# Patient Record
Sex: MALE | Race: WHITE | Employment: FULL TIME | URBAN - METROPOLITAN AREA
[De-identification: names, ages, dates, MRNs, and addresses within clinical notes are randomized per-mention and may not be internally consistent; named-entity substitution may affect disease eponyms.]

---

## 2021-04-11 ENCOUNTER — APPOINTMENT (OUTPATIENT)
Dept: CT IMAGING | Age: 36
DRG: 872 | End: 2021-04-11
Payer: COMMERCIAL

## 2021-04-11 ENCOUNTER — HOSPITAL ENCOUNTER (INPATIENT)
Age: 36
LOS: 3 days | Discharge: HOME | DRG: 872 | End: 2021-04-14
Attending: EMERGENCY MEDICINE | Admitting: INTERNAL MEDICINE
Payer: COMMERCIAL

## 2021-04-11 DIAGNOSIS — A41.9 SEPTICEMIA (HCC): Primary | ICD-10-CM

## 2021-04-11 DIAGNOSIS — K57.20 DIVERTICULITIS OF LARGE INTESTINE WITH PERFORATION WITHOUT BLEEDING: ICD-10-CM

## 2021-04-11 PROBLEM — E66.01 OBESITY, CLASS III, BMI 40-49.9 (MORBID OBESITY) (HCC): Status: ACTIVE | Noted: 2021-04-11

## 2021-04-11 PROBLEM — K57.30 DIVERTICULAR DISEASE OF LARGE INTESTINE WITH COMPLICATION: Status: ACTIVE | Noted: 2021-04-11

## 2021-04-11 PROBLEM — E87.1 HYPONATREMIA: Status: ACTIVE | Noted: 2021-04-11

## 2021-04-11 LAB
-: ABNORMAL
ABSOLUTE EOS #: 0.1 K/UL (ref 0–0.4)
ABSOLUTE IMMATURE GRANULOCYTE: ABNORMAL K/UL (ref 0–0.3)
ABSOLUTE LYMPH #: 3.4 K/UL (ref 1–4.8)
ABSOLUTE MONO #: 1.2 K/UL (ref 0.1–1.2)
ALBUMIN SERPL-MCNC: 4.1 G/DL (ref 3.5–5.2)
ALBUMIN/GLOBULIN RATIO: 1.1 (ref 1–2.5)
ALP BLD-CCNC: 86 U/L (ref 40–129)
ALT SERPL-CCNC: 75 U/L (ref 5–41)
AMORPHOUS: ABNORMAL
ANION GAP SERPL CALCULATED.3IONS-SCNC: 11 MMOL/L (ref 9–17)
AST SERPL-CCNC: 30 U/L
BACTERIA: ABNORMAL
BASOPHILS # BLD: 1 % (ref 0–2)
BASOPHILS ABSOLUTE: 0.1 K/UL (ref 0–0.2)
BILIRUB SERPL-MCNC: 0.84 MG/DL (ref 0.3–1.2)
BILIRUBIN URINE: NEGATIVE
BUN BLDV-MCNC: 9 MG/DL (ref 6–20)
BUN/CREAT BLD: ABNORMAL (ref 9–20)
CALCIUM SERPL-MCNC: 9.3 MG/DL (ref 8.6–10.4)
CASTS UA: ABNORMAL /LPF
CHLORIDE BLD-SCNC: 95 MMOL/L (ref 98–107)
CO2: 25 MMOL/L (ref 20–31)
COLOR: YELLOW
COMMENT UA: ABNORMAL
CREAT SERPL-MCNC: 0.85 MG/DL (ref 0.7–1.2)
CRYSTALS, UA: ABNORMAL /HPF
DIFFERENTIAL TYPE: ABNORMAL
EOSINOPHILS RELATIVE PERCENT: 0 % (ref 1–4)
EPITHELIAL CELLS UA: ABNORMAL /HPF (ref 0–5)
GFR AFRICAN AMERICAN: >60 ML/MIN
GFR NON-AFRICAN AMERICAN: >60 ML/MIN
GFR SERPL CREATININE-BSD FRML MDRD: ABNORMAL ML/MIN/{1.73_M2}
GFR SERPL CREATININE-BSD FRML MDRD: ABNORMAL ML/MIN/{1.73_M2}
GLUCOSE BLD-MCNC: 113 MG/DL (ref 75–110)
GLUCOSE BLD-MCNC: 125 MG/DL (ref 75–110)
GLUCOSE BLD-MCNC: 157 MG/DL (ref 75–110)
GLUCOSE BLD-MCNC: 177 MG/DL (ref 70–99)
GLUCOSE URINE: NEGATIVE
HCT VFR BLD CALC: 44.3 % (ref 41–53)
HEMOGLOBIN: 15.1 G/DL (ref 13.5–17.5)
IMMATURE GRANULOCYTES: ABNORMAL %
INR BLD: 1
KETONES, URINE: NEGATIVE
LACTIC ACID, WHOLE BLOOD: NORMAL MMOL/L (ref 0.7–2.1)
LACTIC ACID: 1.7 MMOL/L (ref 0.5–2.2)
LACTIC ACID: 2.9 MMOL/L (ref 0.5–2.2)
LEUKOCYTE ESTERASE, URINE: NEGATIVE
LYMPHOCYTES # BLD: 21 % (ref 24–44)
MCH RBC QN AUTO: 29.8 PG (ref 26–34)
MCHC RBC AUTO-ENTMCNC: 34.1 G/DL (ref 31–37)
MCV RBC AUTO: 87.6 FL (ref 80–100)
MONOCYTES # BLD: 7 % (ref 2–11)
MUCUS: ABNORMAL
NITRITE, URINE: NEGATIVE
NRBC AUTOMATED: ABNORMAL PER 100 WBC
OTHER OBSERVATIONS UA: ABNORMAL
PARTIAL THROMBOPLASTIN TIME: 25.6 SEC (ref 21.3–31.3)
PDW BLD-RTO: 13 % (ref 12.5–15.4)
PH UA: 5.5 (ref 5–8)
PLATELET # BLD: 224 K/UL (ref 140–450)
PLATELET ESTIMATE: ABNORMAL
PMV BLD AUTO: 8.6 FL (ref 6–12)
POTASSIUM SERPL-SCNC: 3.7 MMOL/L (ref 3.7–5.3)
PROCALCITONIN: 0.31 NG/ML
PROTEIN UA: ABNORMAL
PROTHROMBIN TIME: 10.7 SEC (ref 9.4–12.6)
RBC # BLD: 5.06 M/UL (ref 4.5–5.9)
RBC # BLD: ABNORMAL 10*6/UL
RBC UA: ABNORMAL /HPF (ref 0–2)
RENAL EPITHELIAL, UA: ABNORMAL /HPF
SARS-COV-2, RAPID: NOT DETECTED
SEG NEUTROPHILS: 71 % (ref 36–66)
SEGMENTED NEUTROPHILS ABSOLUTE COUNT: 11.6 K/UL (ref 1.8–7.7)
SODIUM BLD-SCNC: 131 MMOL/L (ref 135–144)
SPECIFIC GRAVITY UA: 1.02 (ref 1–1.03)
SPECIMEN DESCRIPTION: NORMAL
TOTAL PROTEIN: 7.7 G/DL (ref 6.4–8.3)
TRICHOMONAS: ABNORMAL
TURBIDITY: CLEAR
URINE HGB: NEGATIVE
UROBILINOGEN, URINE: NORMAL
WBC # BLD: 16.4 K/UL (ref 3.5–11)
WBC # BLD: ABNORMAL 10*3/UL
WBC UA: ABNORMAL /HPF (ref 0–5)
YEAST: ABNORMAL

## 2021-04-11 PROCEDURE — 2500000003 HC RX 250 WO HCPCS: Performed by: EMERGENCY MEDICINE

## 2021-04-11 PROCEDURE — 80053 COMPREHEN METABOLIC PANEL: CPT

## 2021-04-11 PROCEDURE — 6360000002 HC RX W HCPCS: Performed by: NURSE PRACTITIONER

## 2021-04-11 PROCEDURE — 84145 PROCALCITONIN (PCT): CPT

## 2021-04-11 PROCEDURE — 6360000002 HC RX W HCPCS: Performed by: EMERGENCY MEDICINE

## 2021-04-11 PROCEDURE — 96374 THER/PROPH/DIAG INJ IV PUSH: CPT

## 2021-04-11 PROCEDURE — 83605 ASSAY OF LACTIC ACID: CPT

## 2021-04-11 PROCEDURE — 1210000000 HC MED SURG R&B

## 2021-04-11 PROCEDURE — 85730 THROMBOPLASTIN TIME PARTIAL: CPT

## 2021-04-11 PROCEDURE — 82947 ASSAY GLUCOSE BLOOD QUANT: CPT

## 2021-04-11 PROCEDURE — 6370000000 HC RX 637 (ALT 250 FOR IP): Performed by: NURSE PRACTITIONER

## 2021-04-11 PROCEDURE — 87635 SARS-COV-2 COVID-19 AMP PRB: CPT

## 2021-04-11 PROCEDURE — 2580000003 HC RX 258: Performed by: EMERGENCY MEDICINE

## 2021-04-11 PROCEDURE — APPSS180 APP SPLIT SHARED TIME > 60 MINUTES: Performed by: NURSE PRACTITIONER

## 2021-04-11 PROCEDURE — 2580000003 HC RX 258: Performed by: NURSE PRACTITIONER

## 2021-04-11 PROCEDURE — 99285 EMERGENCY DEPT VISIT HI MDM: CPT

## 2021-04-11 PROCEDURE — 99222 1ST HOSP IP/OBS MODERATE 55: CPT | Performed by: INTERNAL MEDICINE

## 2021-04-11 PROCEDURE — 87040 BLOOD CULTURE FOR BACTERIA: CPT

## 2021-04-11 PROCEDURE — 83036 HEMOGLOBIN GLYCOSYLATED A1C: CPT

## 2021-04-11 PROCEDURE — 85025 COMPLETE CBC W/AUTO DIFF WBC: CPT

## 2021-04-11 PROCEDURE — 36415 COLL VENOUS BLD VENIPUNCTURE: CPT

## 2021-04-11 PROCEDURE — 2500000003 HC RX 250 WO HCPCS: Performed by: NURSE PRACTITIONER

## 2021-04-11 PROCEDURE — 81001 URINALYSIS AUTO W/SCOPE: CPT

## 2021-04-11 PROCEDURE — 74176 CT ABD & PELVIS W/O CONTRAST: CPT

## 2021-04-11 PROCEDURE — 85610 PROTHROMBIN TIME: CPT

## 2021-04-11 RX ORDER — LISINOPRIL 40 MG/1
40 TABLET ORAL DAILY
COMMUNITY

## 2021-04-11 RX ORDER — NICOTINE POLACRILEX 4 MG
15 LOZENGE BUCCAL PRN
Status: DISCONTINUED | OUTPATIENT
Start: 2021-04-11 | End: 2021-04-14 | Stop reason: HOSPADM

## 2021-04-11 RX ORDER — LISINOPRIL 20 MG/1
40 TABLET ORAL DAILY
Status: DISCONTINUED | OUTPATIENT
Start: 2021-04-11 | End: 2021-04-14 | Stop reason: HOSPADM

## 2021-04-11 RX ORDER — PROMETHAZINE HYDROCHLORIDE 25 MG/1
12.5 TABLET ORAL EVERY 6 HOURS PRN
Status: DISCONTINUED | OUTPATIENT
Start: 2021-04-11 | End: 2021-04-14 | Stop reason: HOSPADM

## 2021-04-11 RX ORDER — DEXTROSE MONOHYDRATE 50 MG/ML
100 INJECTION, SOLUTION INTRAVENOUS PRN
Status: DISCONTINUED | OUTPATIENT
Start: 2021-04-11 | End: 2021-04-14 | Stop reason: HOSPADM

## 2021-04-11 RX ORDER — HYDROCODONE BITARTRATE AND ACETAMINOPHEN 5; 325 MG/1; MG/1
2 TABLET ORAL EVERY 4 HOURS PRN
Status: DISCONTINUED | OUTPATIENT
Start: 2021-04-11 | End: 2021-04-14 | Stop reason: HOSPADM

## 2021-04-11 RX ORDER — ONDANSETRON 2 MG/ML
4 INJECTION INTRAMUSCULAR; INTRAVENOUS ONCE
Status: COMPLETED | OUTPATIENT
Start: 2021-04-11 | End: 2021-04-11

## 2021-04-11 RX ORDER — MAGNESIUM SULFATE 1 G/100ML
1000 INJECTION INTRAVENOUS PRN
Status: DISCONTINUED | OUTPATIENT
Start: 2021-04-11 | End: 2021-04-14 | Stop reason: HOSPADM

## 2021-04-11 RX ORDER — DEXTROSE MONOHYDRATE 25 G/50ML
12.5 INJECTION, SOLUTION INTRAVENOUS PRN
Status: DISCONTINUED | OUTPATIENT
Start: 2021-04-11 | End: 2021-04-14 | Stop reason: HOSPADM

## 2021-04-11 RX ORDER — CIPROFLOXACIN 2 MG/ML
400 INJECTION, SOLUTION INTRAVENOUS EVERY 12 HOURS
Status: DISCONTINUED | OUTPATIENT
Start: 2021-04-11 | End: 2021-04-14

## 2021-04-11 RX ORDER — SODIUM CHLORIDE 0.9 % (FLUSH) 0.9 %
5-40 SYRINGE (ML) INJECTION PRN
Status: DISCONTINUED | OUTPATIENT
Start: 2021-04-11 | End: 2021-04-14 | Stop reason: HOSPADM

## 2021-04-11 RX ORDER — SODIUM CHLORIDE, SODIUM LACTATE, POTASSIUM CHLORIDE, AND CALCIUM CHLORIDE .6; .31; .03; .02 G/100ML; G/100ML; G/100ML; G/100ML
1000 INJECTION, SOLUTION INTRAVENOUS ONCE
Status: COMPLETED | OUTPATIENT
Start: 2021-04-11 | End: 2021-04-11

## 2021-04-11 RX ORDER — ONDANSETRON 2 MG/ML
4 INJECTION INTRAMUSCULAR; INTRAVENOUS EVERY 6 HOURS PRN
Status: DISCONTINUED | OUTPATIENT
Start: 2021-04-11 | End: 2021-04-14 | Stop reason: HOSPADM

## 2021-04-11 RX ORDER — KETOROLAC TROMETHAMINE 15 MG/ML
15 INJECTION, SOLUTION INTRAMUSCULAR; INTRAVENOUS ONCE
Status: COMPLETED | OUTPATIENT
Start: 2021-04-11 | End: 2021-04-11

## 2021-04-11 RX ORDER — NICOTINE 21 MG/24HR
1 PATCH, TRANSDERMAL 24 HOURS TRANSDERMAL DAILY
Status: DISCONTINUED | OUTPATIENT
Start: 2021-04-11 | End: 2021-04-14 | Stop reason: HOSPADM

## 2021-04-11 RX ORDER — SODIUM CHLORIDE 9 MG/ML
INJECTION, SOLUTION INTRAVENOUS CONTINUOUS
Status: DISCONTINUED | OUTPATIENT
Start: 2021-04-11 | End: 2021-04-13

## 2021-04-11 RX ORDER — ACETAMINOPHEN 325 MG/1
650 TABLET ORAL EVERY 4 HOURS PRN
Status: DISCONTINUED | OUTPATIENT
Start: 2021-04-11 | End: 2021-04-14 | Stop reason: HOSPADM

## 2021-04-11 RX ORDER — CIPROFLOXACIN 2 MG/ML
400 INJECTION, SOLUTION INTRAVENOUS ONCE
Status: COMPLETED | OUTPATIENT
Start: 2021-04-11 | End: 2021-04-11

## 2021-04-11 RX ORDER — POTASSIUM CHLORIDE 7.45 MG/ML
10 INJECTION INTRAVENOUS PRN
Status: DISCONTINUED | OUTPATIENT
Start: 2021-04-11 | End: 2021-04-14 | Stop reason: HOSPADM

## 2021-04-11 RX ORDER — HYDROCODONE BITARTRATE AND ACETAMINOPHEN 5; 325 MG/1; MG/1
1 TABLET ORAL EVERY 4 HOURS PRN
Status: DISCONTINUED | OUTPATIENT
Start: 2021-04-11 | End: 2021-04-14 | Stop reason: HOSPADM

## 2021-04-11 RX ORDER — SODIUM CHLORIDE 0.9 % (FLUSH) 0.9 %
5-40 SYRINGE (ML) INJECTION EVERY 12 HOURS SCHEDULED
Status: DISCONTINUED | OUTPATIENT
Start: 2021-04-11 | End: 2021-04-14 | Stop reason: HOSPADM

## 2021-04-11 RX ORDER — SODIUM CHLORIDE 9 MG/ML
25 INJECTION, SOLUTION INTRAVENOUS PRN
Status: DISCONTINUED | OUTPATIENT
Start: 2021-04-11 | End: 2021-04-14 | Stop reason: HOSPADM

## 2021-04-11 RX ADMIN — FAMOTIDINE 20 MG: 10 INJECTION INTRAVENOUS at 20:41

## 2021-04-11 RX ADMIN — FAMOTIDINE 20 MG: 10 INJECTION INTRAVENOUS at 12:51

## 2021-04-11 RX ADMIN — ENOXAPARIN SODIUM 40 MG: 40 INJECTION SUBCUTANEOUS at 12:50

## 2021-04-11 RX ADMIN — HYDROCODONE BITARTRATE AND ACETAMINOPHEN 2 TABLET: 5; 325 TABLET ORAL at 20:41

## 2021-04-11 RX ADMIN — METRONIDAZOLE 500 MG: 500 INJECTION, SOLUTION INTRAVENOUS at 20:41

## 2021-04-11 RX ADMIN — SODIUM CHLORIDE, POTASSIUM CHLORIDE, SODIUM LACTATE AND CALCIUM CHLORIDE 1000 ML: 600; 310; 30; 20 INJECTION, SOLUTION INTRAVENOUS at 09:48

## 2021-04-11 RX ADMIN — CIPROFLOXACIN 400 MG: 2 INJECTION, SOLUTION INTRAVENOUS at 22:27

## 2021-04-11 RX ADMIN — CIPROFLOXACIN 400 MG: 2 INJECTION, SOLUTION INTRAVENOUS at 10:49

## 2021-04-11 RX ADMIN — SODIUM CHLORIDE: 9 INJECTION, SOLUTION INTRAVENOUS at 12:03

## 2021-04-11 RX ADMIN — ACETAMINOPHEN 650 MG: 325 TABLET ORAL at 16:46

## 2021-04-11 RX ADMIN — HYDROMORPHONE HYDROCHLORIDE 0.25 MG: 1 INJECTION, SOLUTION INTRAMUSCULAR; INTRAVENOUS; SUBCUTANEOUS at 16:47

## 2021-04-11 RX ADMIN — SODIUM CHLORIDE, POTASSIUM CHLORIDE, SODIUM LACTATE AND CALCIUM CHLORIDE 1000 ML: 600; 310; 30; 20 INJECTION, SOLUTION INTRAVENOUS at 10:56

## 2021-04-11 RX ADMIN — ONDANSETRON 4 MG: 2 INJECTION INTRAMUSCULAR; INTRAVENOUS at 09:48

## 2021-04-11 RX ADMIN — METRONIDAZOLE 500 MG: 500 INJECTION, SOLUTION INTRAVENOUS at 12:02

## 2021-04-11 RX ADMIN — HYDROCODONE BITARTRATE AND ACETAMINOPHEN 1 TABLET: 5; 325 TABLET ORAL at 12:50

## 2021-04-11 RX ADMIN — LISINOPRIL 40 MG: 20 TABLET ORAL at 12:50

## 2021-04-11 RX ADMIN — KETOROLAC TROMETHAMINE 15 MG: 15 INJECTION, SOLUTION INTRAMUSCULAR; INTRAVENOUS at 09:48

## 2021-04-11 SDOH — HEALTH STABILITY: MENTAL HEALTH: HOW OFTEN DO YOU HAVE A DRINK CONTAINING ALCOHOL?: 2-3 TIMES A WEEK

## 2021-04-11 SDOH — HEALTH STABILITY: MENTAL HEALTH: HOW MANY STANDARD DRINKS CONTAINING ALCOHOL DO YOU HAVE ON A TYPICAL DAY?: 1 OR 2

## 2021-04-11 ASSESSMENT — ENCOUNTER SYMPTOMS
VOMITING: 0
CONSTIPATION: 0
SHORTNESS OF BREATH: 0
WHEEZING: 0
SORE THROAT: 0
NAUSEA: 1
STRIDOR: 0
BLOOD IN STOOL: 0
COUGH: 0
VOMITING: 1
DIARRHEA: 0
ABDOMINAL PAIN: 1

## 2021-04-11 ASSESSMENT — PAIN SCALES - GENERAL
PAINLEVEL_OUTOF10: 6
PAINLEVEL_OUTOF10: 4
PAINLEVEL_OUTOF10: 4
PAINLEVEL_OUTOF10: 3
PAINLEVEL_OUTOF10: 2
PAINLEVEL_OUTOF10: 0
PAINLEVEL_OUTOF10: 5

## 2021-04-11 ASSESSMENT — PAIN DESCRIPTION - ONSET: ONSET: ON-GOING

## 2021-04-11 ASSESSMENT — PAIN DESCRIPTION - LOCATION
LOCATION: ABDOMEN
LOCATION: ABDOMEN

## 2021-04-11 ASSESSMENT — PAIN DESCRIPTION - PAIN TYPE: TYPE: ACUTE PAIN

## 2021-04-11 ASSESSMENT — PAIN DESCRIPTION - FREQUENCY: FREQUENCY: INTERMITTENT

## 2021-04-11 ASSESSMENT — PAIN DESCRIPTION - DESCRIPTORS: DESCRIPTORS: STABBING;SHARP

## 2021-04-11 ASSESSMENT — PAIN - FUNCTIONAL ASSESSMENT: PAIN_FUNCTIONAL_ASSESSMENT: ACTIVITIES ARE NOT PREVENTED

## 2021-04-11 ASSESSMENT — PAIN DESCRIPTION - ORIENTATION: ORIENTATION: MID;LOWER

## 2021-04-11 ASSESSMENT — PAIN DESCRIPTION - PROGRESSION: CLINICAL_PROGRESSION: NOT CHANGED

## 2021-04-11 NOTE — LETTER
[facility]  [address] [facility] [address]  Phone: [phone]             April 14, 2021    Patient: Angeli Valderrama   YOB: 1985   Date of Visit: 4/11/2021       To Whom It May Concern: Angeli Valderrama was seen and treated in our facility  beginning 4/11/2021 until . He may return to work on April 19, 2021.       Sincerely,       SCAR Nassar NP         Signature:__________________________________

## 2021-04-11 NOTE — ED NOTES
Dona Aguirre, DO at bedside updating pt on results and plan of care.          Kem Parra RN  04/11/21 3461

## 2021-04-11 NOTE — CONSULTS
Chief Complaint   Patient presents with    Abdominal Pain       HxCC:  32 y/o male admitted for abdominal pain. Patient states periumbilical and suprapubic crampy abdominal pain started on Friday. Patient states that the pain became sharp yesterday. Complains of mild nausea. Pain today is improved compared to yesterday. Patient had bowel movement yesterday. Denies fevers, chills, or sweats. Denies melena or hematochezia. Patient with previous hx of uncomplicated diverticulitis in 2019. Patient was treated as outpatient. Patient never had follow up colonoscopy after that episode.       Vitals:    04/11/21 1153   BP: 134/85   Pulse: 82   Resp: 14   Temp: 98.8 °F (37.1 °C)   SpO2: 99%       Patient Active Problem List   Diagnosis    Tobacco abuse    Hypertension    Diverticulosis    Diverticulitis of large intestine with perforation    Diverticular disease of large intestine with complication       Current Facility-Administered Medications   Medication Dose Route Frequency Provider Last Rate Last Admin    lisinopril (PRINIVIL;ZESTRIL) tablet 40 mg  40 mg Oral Daily Shwetha Sane, APRN - CNP   40 mg at 04/11/21 1250    0.9 % sodium chloride infusion   Intravenous Continuous Shwetha Sane, APRN -  mL/hr at 04/11/21 1203 New Bag at 04/11/21 1203    sodium chloride flush 0.9 % injection 5-40 mL  5-40 mL Intravenous 2 times per day Shwetha Sane, APRN - CNP        sodium chloride flush 0.9 % injection 5-40 mL  5-40 mL Intravenous PRN Shwetha Sane, APRN - CNP        0.9 % sodium chloride infusion  25 mL Intravenous PRN Shwetha Sane, APRN - CNP        potassium chloride 10 mEq/100 mL IVPB (Peripheral Line)  10 mEq Intravenous PRN Shwetha Sane, APRN - CNP        magnesium sulfate 1000 mg in dextrose 5% 100 mL IVPB  1,000 mg Intravenous PRN Shwetha Sane, APRN - CNP        acetaminophen (TYLENOL) tablet 650 mg  650 mg Oral Q4H PRN Shwetha Sane, APRN - CNP       Armida Brunner HYDROcodone-acetaminophen (NORCO) 5-325 MG per tablet 1 tablet  1 tablet Oral Q4H PRN Devan Tapia, APRN - CNP   1 tablet at 04/11/21 1250    Or    HYDROcodone-acetaminophen (NORCO) 5-325 MG per tablet 2 tablet  2 tablet Oral Q4H PRN Devan Tapia, APRN - CNP        HYDROmorphone (DILAUDID) injection 0.25 mg  0.25 mg Intravenous Q3H PRN Devan Tapia, APRN - CNP        Or    HYDROmorphone (DILAUDID) injection 0.5 mg  0.5 mg Intravenous Q3H PRN Devan Tapia, APRN - CNP        promethazine (PHENERGAN) tablet 12.5 mg  12.5 mg Oral Q6H PRN Devan Tapia, APRN - CNP        Or    ondansetron (ZOFRAN) injection 4 mg  4 mg Intravenous Q6H PRN Devan Tapia, APRN - CNP        famotidine (PEPCID) injection 20 mg  20 mg Intravenous BID Devan Tapia, APRN - CNP   20 mg at 04/11/21 1251    enoxaparin (LOVENOX) injection 40 mg  40 mg Subcutaneous Daily Devan Tapia, APRN - CNP   40 mg at 04/11/21 1250    ciprofloxacin (CIPRO) IVPB 400 mg  400 mg Intravenous Q12H Devan Tapia, APRN - CNP        metronidazole (FLAGYL) 500 mg in NaCl 100 mL IVPB premix  500 mg Intravenous Q8H Devan Tapia, APRN - CNP        nicotine (NICODERM CQ) 21 MG/24HR 1 patch  1 patch Transdermal Daily Devan Tapia, APRN - CNP        glucose (GLUTOSE) 40 % oral gel 15 g  15 g Oral PRN Devan Tapia, APRN - CNP        dextrose 50 % IV solution  12.5 g Intravenous PRN Devan Tapia, APRN - CNP        glucagon (rDNA) injection 1 mg  1 mg Intramuscular PRN Devan Tapia, APRN - CNP        dextrose 5 % solution  100 mL/hr Intravenous PRN Devan Tapia, APRN - CNP           No Known Allergies    Past Medical History:   Diagnosis Date    [facility]     4221. not  hospitalized    Diverticulitis of large intestine with perforation     Hypertension     Tobacco abuse        History reviewed. No pertinent surgical history.     Family History   Problem Relation Age of Onset    Diabetes Mother     Diabetes Father     Diabetes rigidity. Ext: motor 5/5 all extremities with no gross deformities    WBC 16. 4High   3.5 - 11.0 k/uL Final 04/11/2021  9:45 AM [facility]   RBC 5.06  4.5 - 5.9 m/uL Final 04/11/2021  9:45 AM [facility]   Hemoglobin 15.1  13.5 - 17.5 g/dL Final 04/11/2021  9:45 AM [facility]   Hematocrit 44.3  41 - 53 % Final 04/11/2021  9:45 AM [facility]   MCV 87.6  80 - 100 fL Final 04/11/2021  9:45 AM [facility]   MCH 29.8  26 - 34 pg Final 04/11/2021  9:45 AM [facility]   MCHC 34.1  31 - 37 g/dL Final 04/11/2021  9:45 AM [facility]   RDW 13.0  12.5 - 15.4 % Final 04/11/2021  9:45 AM [facility]   Platelets 602  029 - 450 k/uL Final 04/11/2021  9:45 AM [facility]   MPV 8.6  6.0 - 12.0 fL Final 04/11/2021  9:45 AM [facility]   NRBC Automated NOT REPORTED  per 100 WBC Final 04/11/2021  9:45 AM [facility]   Differential Type NOT REPORTED   Final 04/11/2021  9:45 AM [facility]   Seg Neutrophils 71High   36 - 66 % Final 04/11/2021  9:45 AM [facility]   Lymphocytes 21Low   24 - 44 % Final 04/11/2021  9:45 AM [facility]   Monocytes 7  2 - 11 % Final 04/11/2021  9:45 AM [facility]   Eosinophils % 0Low   1 - 4 % Final 04/11/2021  9:45 AM [facility]   Basophils 1  0 - 2 % Final 04/11/2021  9:45 AM [facility]   Immature Granulocytes NOT REPORTED  0 % Final 04/11/2021  9:45 AM [facility]   Segs Absolute 11. 60High   1.8 - 7.7 k/uL Final 04/11/2021  9:45 AM [facility]   Absolute Lymph # 3.40  1.0 - 4.8 k/uL Final 04/11/2021  9:45 AM [facility]   Absolute Mono # 1.20  0.1 - 1.2 k/uL Final 04/11/2021  9:45 AM [facility]   Absolute Eos # 0.10  0.0 - 0.4 k/uL Final 04/11/2021  9:45 AM [facility]   Basophils Absolute 0.10  0.0 - 0.2 k/uL Final 04/11/2021  9:45 AM [facility]   Absolute Immature Granulocyte NOT REPORTED  0.00 - 0.30 k/uL Final 04/11/2021  9:45 AM [facility]   WBC Morphology NOT REPORTED   Final 04/11/2021  9:45 AM [facility]   RBC Morphology NOT REPORTED   Final 04/11/2021  9:45 AM [facility]   Platelet Estimate NOT REPORTED   Final 04/11/2021  9:45 AM [facility]     EXAMINATION:   CT OF THE ABDOMEN AND PELVIS WITHOUT CONTRAST 4/11/2021 9:35 am       TECHNIQUE:   CT of the abdomen and pelvis was performed without the administration of   intravenous contrast. Multiplanar reformatted images are provided for review. Dose modulation, iterative reconstruction, and/or weight based adjustment of   the mA/kV was utilized to reduce the radiation dose to as low as reasonably   achievable.       COMPARISON:   None.       HISTORY:   ORDERING SYSTEM PROVIDED HISTORY: abdominal pain   TECHNOLOGIST PROVIDED HISTORY:       abdominal pain   Decision Support Exception->Emergency Medical Condition (MA)   Reason for Exam: Abdominal pain, nausea, vomiting   Acuity: Acute   Type of Exam: Initial       FINDINGS:   Lower Chest: The lung bases are clear. The visualized heart is unremarkable.       Organs: There is mild fatty infiltration of the liver.  The gallbladder,   pancreas, spleen and adrenal glands are unremarkable.       The kidneys are symmetrical in size. There is no obstructing calculus or   hydronephrosis.       GI/Bowel: The stomach and small bowel are unremarkable.  The appendix is   normal.  There is pancolonic diverticulosis.  In the mid sigmoid colon there   is a focus of mural thickening, including a diverticulum. Arneta Marion is a small   amount of adjacent extraluminal air.  There is also pericolonic edema and   thickening of the combined fascia.  No abscess is identified.       Pelvis: Urinary bladder and prostate gland are unremarkable.       Peritoneum/Retroperitoneum: There is no adenopathy or aneurysm.       Bones/Soft Tissues: No acute osseous abnormality is identified.  Soft tissues   of the abdominal wall are unremarkable.           Impression   Acute diverticulitis of the mid sigmoid colon, with mild localized perforation/pneumoperitoneum.       Critical results were called by Dr. Casandra Loza MD to Roseanneleni Lau   on 4/11/2021 at 10:41. Impression:  1. Complicated diverticulitis    Plan:  Patient with complicated diverticulitis. CT images reviewed. Small amount of free air noted. No abscess or fluid collection identified. Patient has mild tenderness but no peritoneal signs on physical exam.  Patient does not have an acute surgical abdomen. Will plan for nonsurgical treatment with iv antibiotics and bowel rest.  Will reevaluate in am.  If patient develops worsening leukocytosis or worsening abdominal exam, patient will require emergent sigmoid colectomy with creation of temporary end colostomy. If patient responds to nonsurgical treatment, discussed diagnostic colonoscopy in 6-8 weeks. I also discussed with patient possibility of elective one stage sigmoid colon resection in 2-3 months when inflammation resolves since this complicated course of diverticulitis is his second occurrence. Patient understands and will consider his options. Patient also educated that he will need to change his diet to include but not limited to daily metamucil, increasing dietary fiber, and increasing daily water intake. Patient understands and had no further questions at the end of the visit.       Electronically signed by Ellen Mcduffie DO on 4/11/21 at 3:18 PM EDT

## 2021-04-11 NOTE — ED PROVIDER NOTES
[address] [facility]  [address] [facility] [address]  Phone: [phone]  Fax: [phone] [facility]  [address]      Pt Name: Angeli Valderrama  MRN: [id number]  [place] 1985  Date of evaluation: 4/11/2021  Provider: Cyrus Aguilera, [facility]       Chief Complaint   Patient presents with    Abdominal Pain         HISTORY OF PRESENT ILLNESS   (Location/Symptom, Timing/Onset,Context/Setting, Quality, Duration, Modifying Factors, Severity)  Note limiting factors. Angeli Valderrama is a 31 y.o. male who presents to the emergency department for the evaluation of lower abdominal pain. Patient states this is been going on for about 2 days. He states it has not resolved. Has a history of diverticulitis and he was concerned this could be the same. He describes a crampy pain in his periumbilical region that radiates to both the left and right side. He has no diarrhea but he does have some constipation he also has pain with urination. No blood in stool. No blood in urine. No history of UTI. No previous abdominal surgeries. Has not had anything to eat today but last drank some juice about 5 hours ago    Nursing Notes were reviewed. REVIEW OF SYSTEMS    (2-9systems for level 4, 10 or more for level 5)     Review of Systems   Constitutional: Negative for fever. HENT: Negative for sore throat. Respiratory: Negative for shortness of breath. Cardiovascular: Negative for chest pain. Gastrointestinal: Positive for abdominal pain. Negative for diarrhea and vomiting. Genitourinary: Positive for dysuria. Skin: Negative for rash. Neurological: Negative for weakness. All other systems reviewed and are negative. Except asnoted above the remainder of the review of systems was reviewed and negative.        PAST MEDICAL HISTORY     Past Medical History:   Diagnosis Date    Diverticulosis     Hypertension SURGICAL HISTORY     History reviewed. No pertinent surgical history. CURRENT MEDICATIONS     Previous Medications    LISINOPRIL (PRINIVIL;ZESTRIL) 40 MG TABLET    Take 40 mg by mouth daily       ALLERGIES     Patient has no known allergies. FAMILY HISTORY     History reviewed. No pertinent family history.        SOCIAL HISTORY       Social History     Socioeconomic History    Marital status: Single     Spouse name: None    Number of children: None    Years of education: None    Highest education level: None   Occupational History    None   Social Needs    Financial resource strain: None    Food insecurity     Worry: None     Inability: None    Transportation needs     Medical: None     Non-medical: None   Tobacco Use    Smoking status: Current Every Day Smoker     Packs/day: 0.25    Smokeless tobacco: Never Used   Substance and Sexual Activity    Alcohol use: Yes     Comment: Socially    Drug use: Never    Sexual activity: None   Lifestyle    Physical activity     Days per week: None     Minutes per session: None    Stress: None   Relationships    Social connections     Talks on phone: None     Gets together: None     Attends Taoist service: None     Active member of club or organization: None     Attends meetings of clubs or organizations: None     Relationship status: None    Intimate partner violence     Fear of current or ex partner: None     Emotionally abused: None     Physically abused: None     Forced sexual activity: None   Other Topics Concern    None   Social History Narrative    None       SCREENINGS    Chante Coma Scale  Eye Opening: Spontaneous  Best Verbal Response: Oriented  Best Motor Response: Obeys commands  [place] Coma Scale Score: 15        PHYSICAL EXAM    (up to 7 for level 4, 8 or more for level 5)     ED Triage Vitals [04/11/21 0923]   BP Temp Temp Source Pulse Resp SpO2 Height Weight   (!) 144/103 101 °F (38.3 °C) Oral 129 16 98 % 5' 11\" (1.803 m) 280 lb (127 kg)       Physical Exam  Vitals signs and nursing note reviewed. Constitutional:       General: He is not in acute distress. Appearance: Normal appearance. He is obese. He is not ill-appearing or toxic-appearing. HENT:      Head: Normocephalic and atraumatic. Nose: Nose normal. No congestion. Mouth/Throat:      Mouth: Mucous membranes are moist.   Eyes:      General:         Right eye: No discharge. Left eye: No discharge. Conjunctiva/sclera: Conjunctivae normal.   Neck:      Musculoskeletal: Normal range of motion. Cardiovascular:      Rate and Rhythm: Regular rhythm. Tachycardia present. Pulses: Normal pulses. Heart sounds: Normal heart sounds. No murmur. Pulmonary:      Effort: Pulmonary effort is normal. No respiratory distress. Breath sounds: Normal breath sounds. No wheezing. Abdominal:      General: Abdomen is flat. There is no distension. Palpations: Abdomen is soft. Tenderness: There is abdominal tenderness in the right lower quadrant. There is no guarding or rebound. Positive signs include McBurney's sign. Hernia: No hernia is present. Musculoskeletal: Normal range of motion. General: No deformity or signs of injury. Skin:     General: Skin is warm and dry. Capillary Refill: Capillary refill takes less than 2 seconds. Findings: No rash. Neurological:      General: No focal deficit present. Mental Status: He is alert. Mental status is at baseline. Motor: No weakness. Comments: Speaking normally. No facial asymmetry. Moving all 4 extremities. Normal gait.     Psychiatric:         Mood and Affect: Mood normal.         EMERGENCY DEPARTMENT COURSE and DIFFERENTIAL DIAGNOSIS/MDM:   Vitals:    Vitals:    04/11/21 0923 04/11/21 1011   BP: (!) 144/103 122/73   Pulse: 129 95   Resp: 16    Temp: 101 °F (38.3 °C) 100.3 °F (37.9 °C)   TempSrc: Oral Oral   SpO2: 98%    Weight: 127 kg (280 lb)    Height: 5' 11\" (1.803 m)        Patient presents to the emergency department with a complaint described above. Vital signs show hypertension, tachycardia and fever. Physical examination reveals some tenderness in the right lower quadrant near the area of McBurney's point, may be a little bit higher. No focal left lower quadrant tenderness, no distention, rebound or rigidity. At this time I am going to get full metabolic work-up that includes blood cultures and lactic acid, I will give IV fluids, I will give Toradol, I am going to also get a urinalysis and a noncontrast CT and I will reevaluate. Differential diagnosis is broad but strongly suspect this could represent an appendicitis or other intra-abdominal pathophysiology or even UTI/a sending UTI/pyelonephritis      DIAGNOSTIC RESULTS     LABS:  Labs Reviewed   CBC WITH AUTO DIFFERENTIAL - Abnormal; Notable for the following components:       Result Value    WBC 16.4 (*)     Seg Neutrophils 71 (*)     Lymphocytes 21 (*)     Eosinophils % 0 (*)     Segs Absolute 11.60 (*)     All other components within normal limits   COMPREHENSIVE METABOLIC PANEL - Abnormal; Notable for the following components:    Glucose 177 (*)     Sodium 131 (*)     Chloride 95 (*)     ALT 75 (*)     All other components within normal limits   LACTIC ACID - Abnormal; Notable for the following components:    Lactic Acid 2.9 (*)     All other components within normal limits   CULTURE, BLOOD 1   CULTURE, BLOOD 1   COVID-19, RAPID   APTT   PROTIME-INR   URINE RT REFLEX TO CULTURE   LACTIC ACID       All other labs were within normal range or not returned as of this dictation. RADIOLOGY:  CT ABDOMEN PELVIS WO CONTRAST Additional Contrast? None   Final Result   Acute diverticulitis of the mid sigmoid colon, with mild localized   perforation/pneumoperitoneum. Critical results were called by Dr. Beck Thorne MD to Lili Limon   on 4/11/2021 at 10:41.                ED Course as of Apr 11 [address] [place] 22 Apr 11, 2021   1040 Rupert Castro results show leukocytosis, as well as an elevated lactic acid. [TS]   1869 Radiology called to report acute diverticulitis with microperforation. Will start patient on broad-spectrum antibiotics, will contact surgery to discuss admission    [TS]   1045 Repeat lactic acid ordered, broad-spectrum antibiotics initiated, IV fluids ordered, will admit to hospitalist after speaking with general surgery    [TS]      ED Course User Index  [TS] Elaina Jimenez,      Sepsis Times and Checklist  Vital Signs: BP: 122/73  Pulse: 95  Resp: 16  Temp: 100.3 °F (37.9 °C) SpO2: 98 %  SIRS (>2)   Temp > 38.3C or < 36C   HR > 90   RR > 20   WBC > 12 or < 4 or >10% bands  SIRS (>2) and confirmed or suspected source of infection = Sepsis      Severe Sepsis Identified:  Date: 4/11   Time: 1046  Sepsis Orders:  ·  CBC: Yes  ·  CMP: Yes  ·  PT/PTT: Yes  ·  Blood Cultures x2: Yes  ·  Urinalysis and Urine Culture:  Yes  ·  Lactate: Yes  If lactate > 2.0 MUST repeat within 6 hours  ·  Broad Spectrum Antibiotics Given (within 3 hours of sepsis of identification, after blood cultures):  Yes    (If unable to obtain IV access for IV antibiotics within 3 hours of identification of sepsis, IM antibiotics are acceptable.)    Intra - Abdominal Source  Mild to Moderate Severity = Ceftriaxone 1 gm IV plus Metronidazole 500 mg IV, if Penicillin allergy then Levofloxacin 500 mg IV  High Severity or High Risk = Zosyn 3.375 gm IV or Cefepime 2 gm IV plus Metronidazole 500 mg IV, if Penicillin allergy then Levofloxacin 500 mg IV    Meningitis  Ceftriaxone 2 gm IV plus Vancomycin 25 mg/kg IV, if Penicillin allergy then Levofloxacin 500 mg IV plus Vancomycin 25 mg/kg IV   If concern for viral meningitis then add Acyclovir 10 mg/kg IV using ideal body weight  If concern for Pneumococcal meningitis then Dexamethasone 10 mg IV prior to antibiotics  If concern for Listeria based on CSF gram stain then add Ampicillin 2 gm IV or Bactrim 5 mg/kg IV if severe Penicillin allergy    Urosepsis  Community acquired = Ceftriaxone 1 gm IV, if Penicillin allergy then Meropenem 1 gm IV  Hospital acquired = Zosyn 3.375 gm IV or Cefepime 1 gm IV, if Penicillin allergy then Meropenem 1 gm IV    Skin / Soft Tissue  Cellulitis / Erysipelas = Ceftriaxone 1 gm IV plus Vancomycin 15 mg/kg IV, if Penicillin allergy then Levofloxacin 500 gm IV  Necrotizing infection / Severe Cellulitis = Zosyn 3.375 gm IV plus Vancomycin 15 mg/kg IV, if Penicillin allergy then Clindamycin 900 mg IV plus Vancomycin 15 mg/kg IV plus Ciprofloxacin 500 mg IV    Respiratory  Community Acquired Pneumonia = Ceftriaxone 1 gm IV plus Azithromycin 500 mg IV, if Penicillin allergy then Levofloxacin 750 mg IV. Hospital Acquired Pneumonia = Zosyn 4.5 gm IV plus Vancomycin 15 mg/kg IV, if Penicillin allergy then Meropenem 1 gm IV plus Vancomycin 15 mg/kg IV    Unknown Source  Zosyn 3.375 gm IV plus Vancomycin 15 mg/kg IV, if Penicillin allergy then Meropenem 1 gm IV plus Vancomycin 15 mg/kg IV      IV Fluid Bolus:  Is lactate > 4.0:  No  If lactate >  4.0 OR hypotension (MAP<65 mmHg) (with 2 BP readings) IV fluids MUST be ordered. For persistent hypotension after IV fluid bolus vasopressors must be started (within 6 hours)    Critical Care: The high probability of sudden, clinically significant deterioration in the patient's condition required the highest level of my preparedness to intervene urgently. The services I provided to this patient were to treat and/or prevent clinically significant deterioration. Services included the following: chart data review, reviewing nursing notes and/or old charts, documentation time, consultant collaboration regarding findings and treatment options, medication orders and management, direct patient care, vital sign assessments and ordering, interpreting and reviewing diagnostic studies/lab tests.     Aggregate critical care time includes only time during which I was engaged in work directly related to the patient's care, as described above, whether at the bedside or elsewhere in the Emergency Department. It did not include time spent performing other reported procedures or the services of residents, students, nurses or physician assistants. Critical Care:   35 minutes    PROCEDURES:  Unless otherwise noted below, none     Procedures    FINAL IMPRESSION      1. Septicemia ([address]    2. Diverticulitis of large intestine with perforation without bleeding          DISPOSITION/PLAN   DISPOSITION Decision To Admit 04/11/2021 10:42:11 AM      PATIENT REFERRED TO:  No follow-up provider specified.     DISCHARGE MEDICATIONS:  New Prescriptions    No medications on file          (Please note that portions of this note were completed with a voice recognition program.  Efforts were made to edit the dictations but occasionally words are mis-transcribed.)    Dorman Cushing, DO (electronically signed)  Board Certified Emergency Physician         Dorman Cushing, DO  04/11/21 1046

## 2021-04-11 NOTE — ED NOTES
Pt arrives to ED with c/o abdominal pain that started on Friday, 4/9/21. He relates that he has had diverticulosis in the past, and he feels as if his pain is similar. He states that he has had a decrease in his oral intake and appetite. He attempted to eat a chicken sandwich last night, and states that he vomited after. Pt ambulates with steady gait. Comfort offered, no concerns no s/s of distress, will continue to monitor.       Calvin Warren RN  04/11/21 1433

## 2021-04-11 NOTE — H&P
reports of history of diverticulitis about 3 yrs that didn't require hospitalization. CT shows Acute diverticulitis of the mid sigmoid colon, with mild localized   Perforation/pneumoperitoneum. Temp on arrival 101 and he was tachycardic at 129  WBC 16.4  Sodium 131  Initial lactic 2.9 after fluids 1.7  Glucose 177    Dr Lanre Mcnulty has been consulted. He recommended Cipro and flagyl IV      Past Medical History:     Past Medical History:   Diagnosis Date    dicerticullits     7760. not  hospitalized    Diverticulitis of large intestine with perforation     Hypertension     Tobacco abuse         Past Surgical History:     History reviewed. No pertinent surgical history. Medications Prior to Admission:     Prior to Admission medications    Medication Sig Start Date End Date Taking? Authorizing Provider   lisinopril (PRINIVIL;ZESTRIL) 40 MG tablet Take 40 mg by mouth daily   Yes Historical Provider, MD        Allergies:     Patient has no known allergies. Social History:     Tobacco:    reports that he has been smoking. He has a 3.75 pack-year smoking history. He has never used smokeless tobacco.  Alcohol:      reports current alcohol use. Drug Use:  reports no history of drug use. Family History:     Family History   Problem Relation Age of Onset    Diabetes Mother     Diabetes Father     Diabetes Sister     Diabetes Brother        Review of Systems:     Positive and Negative as described in HPI. Review of Systems   Constitutional: Positive for appetite change, chills and fever. Negative for diaphoresis. HENT: Negative for congestion and hearing loss. Respiratory: Negative for cough, shortness of breath, wheezing and stridor. Cardiovascular: Negative for chest pain, palpitations and leg swelling. Gastrointestinal: Positive for abdominal pain, nausea and vomiting. Negative for blood in stool, constipation and diarrhea. Genitourinary: Negative for dysuria and frequency. k/uL    MPV 8.6 6.0 - 12.0 fL    NRBC Automated NOT REPORTED per 100 WBC    Differential Type NOT REPORTED     Seg Neutrophils 71 (H) 36 - 66 %    Lymphocytes 21 (L) 24 - 44 %    Monocytes 7 2 - 11 %    Eosinophils % 0 (L) 1 - 4 %    Basophils 1 0 - 2 %    Immature Granulocytes NOT REPORTED 0 %    Segs Absolute 11.60 (H) 1.8 - 7.7 k/uL    Absolute Lymph # 3.40 1.0 - 4.8 k/uL    Absolute Mono # 1.20 0.1 - 1.2 k/uL    Absolute Eos # 0.10 0.0 - 0.4 k/uL    Basophils Absolute 0.10 0.0 - 0.2 k/uL    Absolute Immature Granulocyte NOT REPORTED 0.00 - 0.30 k/uL    WBC Morphology NOT REPORTED     RBC Morphology NOT REPORTED     Platelet Estimate NOT REPORTED    Comprehensive Metabolic Panel    Collection Time: 04/11/21  9:45 AM   Result Value Ref Range    Glucose 177 (H) 70 - 99 mg/dL    BUN 9 6 - 20 mg/dL    CREATININE 0.85 0.70 - 1.20 mg/dL    Bun/Cre Ratio NOT REPORTED 9 - 20    Calcium 9.3 8.6 - 10.4 mg/dL    Sodium 131 (L) 135 - 144 mmol/L    Potassium 3.7 3.7 - 5.3 mmol/L    Chloride 95 (L) 98 - 107 mmol/L    CO2 25 20 - 31 mmol/L    Anion Gap 11 9 - 17 mmol/L    Alkaline Phosphatase 86 40 - 129 U/L    ALT 75 (H) 5 - 41 U/L    AST 30 <40 U/L    Total Bilirubin 0.84 0.3 - 1.2 mg/dL    Total Protein 7.7 6.4 - 8.3 g/dL    Albumin 4.1 3.5 - 5.2 g/dL    Albumin/Globulin Ratio 1.1 1.0 - 2.5    GFR Non-African American >60 >60 mL/min    GFR African American >60 >60 mL/min    GFR Comment          GFR Staging NOT REPORTED    Lactic Acid    Collection Time: 04/11/21  9:45 AM   Result Value Ref Range    Lactic Acid 2.9 (H) 0.5 - 2.2 mmol/L   APTT    Collection Time: 04/11/21  9:45 AM   Result Value Ref Range    PTT 25.6 21.3 - 31.3 sec   PROTIME-INR    Collection Time: 04/11/21  9:45 AM   Result Value Ref Range    Protime 10.7 9.4 - 12.6 sec    INR 1.0    COVID-19, Rapid    Collection Time: 04/11/21 10:50 AM    Specimen: Nasopharyngeal Swab   Result Value Ref Range    Specimen Description . NASOPHARYNGEAL SWAB     SARS-CoV-2, Rapid Not Detected Not Detected   Urinalysis Reflex to Culture    Collection Time: 04/11/21 11:00 AM    Specimen: Urine, clean catch   Result Value Ref Range    Color, UA YELLOW YELLOW    Turbidity UA CLEAR CLEAR    Glucose, Ur NEGATIVE NEGATIVE    Bilirubin Urine NEGATIVE NEGATIVE    Ketones, Urine NEGATIVE NEGATIVE    Specific Gravity, UA 1.024 1.005 - 1.030    Urine Hgb NEGATIVE NEGATIVE    pH, UA 5.5 5.0 - 8.0    Protein, UA 1+ (A) NEGATIVE    Urobilinogen, Urine Normal Normal    Nitrite, Urine NEGATIVE NEGATIVE    Leukocyte Esterase, Urine NEGATIVE NEGATIVE    Urinalysis Comments NOT REPORTED    Microscopic Urinalysis    Collection Time: 04/11/21 11:00 AM   Result Value Ref Range    -          WBC, UA 0 TO 2 0 - 5 /HPF    RBC, UA None 0 - 2 /HPF    Casts UA NOT REPORTED /LPF    Crystals, UA NOT REPORTED None /HPF    Epithelial Cells UA 0 TO 2 0 - 5 /HPF    Renal Epithelial, UA NOT REPORTED 0 /HPF    Bacteria, UA FEW (A) None    Mucus, UA 1+ (A) None    Trichomonas, UA NOT REPORTED None    Amorphous, UA NOT REPORTED None    Other Observations UA (A) NOT REQ. Utilizing a urinalysis as the only screening method to exclude a potential uropathogen can be unreliable in many patient populations. Rapid screening tests are less sensitive than culture and if UTI is a clinical possibility, culture should be considered despite a negative urinalysis. Yeast, UA NOT REPORTED None       Imaging/Diagnostics:    Ct Abdomen Pelvis Wo Contrast Additional Contrast? None    Result Date: 4/11/2021  Acute diverticulitis of the mid sigmoid colon, with mild localized perforation/pneumoperitoneum. Critical results were called by Dr. Ramy Simms MD to Yulissa Luo on 4/11/2021 at 10:41.        Assessment :      Hospital Problems           Last Modified POA    * (Principal) Diverticulitis of large intestine with perforation 4/11/2021 Yes    Tobacco abuse 4/11/2021 Yes    Hypertension 4/11/2021 Yes    Diverticular disease of large intestine with complication 0/94/3518 Yes          Plan:     Patient status inpatient in the Med/Surge    Diverticulitis with perforation/pneumoperitoneum; general surgery following. NPO, Cipro and Flagyl IV. IV fluids,  PPI. Patient encouraged to follow diverticulosis diet/restrictions    Encourage smoking cessation     HTN; noncompliance with medication. Compliance encouraged    Strong family history of diabetes and elevated glucose on admission. Check a1c and glucose ac/hs    DVT prophylaxis      Consultations:   IP CONSULT TO GENERAL SURGERY     Patient is admitted as inpatient status because of co-morbidities listed above, severity of signs and symptoms as outlined, requirement for current medical therapies and most importantly because of direct risk to patient if care not provided in a hospital setting. Expected length of stay > 48 hours.     SCAR Park - CNP  4/11/2021  12:28 PM    Copy sent to Dr. Candida Alvarado MD

## 2021-04-11 NOTE — PLAN OF CARE
Problem: Pain:  Goal: Pain level will decrease  Description: Pain level will decrease  Outcome: Ongoing  Goal: Control of acute pain  Description: Control of acute pain  Outcome: Ongoing  Goal: Control of chronic pain  Description: Control of chronic pain  Outcome: Ongoing  Goal: Patient's pain/discomfort is manageable  Description: Patient's pain/discomfort is manageable  Outcome: Ongoing     Problem: Infection:  Goal: Will remain free from infection  Description: Will remain free from infection  Outcome: Ongoing     Problem: Safety:  Goal: Free from accidental physical injury  Description: Free from accidental physical injury  Outcome: Ongoing  Goal: Free from intentional harm  Description: Free from intentional harm  Outcome: Ongoing     Problem: Daily Care:  Goal: Daily care needs are met  Description: Daily care needs are met  Outcome: Ongoing     Problem: Skin Integrity:  Goal: Skin integrity will stabilize  Description: Skin integrity will stabilize  Outcome: Ongoing     Problem: Discharge Planning:  Goal: Patients continuum of care needs are met  Description: Patients continuum of care needs are met  Outcome: Ongoing     Problem: Activity:  Goal: Ability to tolerate increased activity will improve  Description: Ability to tolerate increased activity will improve  Outcome: Ongoing     Problem:  Bowel/Gastric:  Goal: Bowel function will improve  Description: Bowel function will improve  Outcome: Ongoing  Goal: Complications related to the disease process, condition or treatment will be avoided or minimized  Description: Complications related to the disease process, condition or treatment will be avoided or minimized  Outcome: Ongoing     Problem: Coping:  Goal: Ability to identify strategies to decrease anxiety will improve  Description: Ability to identify strategies to decrease anxiety will improve  Outcome: Ongoing     Problem: Fluid Volume:  Goal: Will maintain adequate fluid volume  Description: Will maintain adequate fluid volume  Outcome: Ongoing     Problem: Nutritional:  Goal: Nutritional status will improve  Description: Nutritional status will improve  Outcome: Ongoing     Problem: Sensory:  Goal: Pain level will decrease  Description: Pain level will decrease  Outcome: Ongoing     Problem: Skin Integrity:  Goal: Skin integrity will be maintained  Description: Skin integrity will be maintained  Outcome: Ongoing       Pt able to verbalize pain and RN continues to monitor pain control. PT on antibiotics, up as tolerated, free from falls, maintaining fluid volume with IV fluids. NPO until pain is better controlled.

## 2021-04-12 LAB
ALBUMIN SERPL-MCNC: 3.8 G/DL (ref 3.5–5.2)
ALBUMIN/GLOBULIN RATIO: 1.3 (ref 1–2.5)
ALP BLD-CCNC: 71 U/L (ref 40–129)
ALT SERPL-CCNC: 52 U/L (ref 5–41)
ANION GAP SERPL CALCULATED.3IONS-SCNC: 9 MMOL/L (ref 9–17)
AST SERPL-CCNC: 25 U/L
BILIRUB SERPL-MCNC: 0.78 MG/DL (ref 0.3–1.2)
BUN BLDV-MCNC: 9 MG/DL (ref 6–20)
BUN/CREAT BLD: ABNORMAL (ref 9–20)
CALCIUM IONIZED: 1.16 MMOL/L (ref 1.13–1.33)
CALCIUM SERPL-MCNC: 8.7 MG/DL (ref 8.6–10.4)
CHLORIDE BLD-SCNC: 102 MMOL/L (ref 98–107)
CO2: 25 MMOL/L (ref 20–31)
CREAT SERPL-MCNC: 0.76 MG/DL (ref 0.7–1.2)
ESTIMATED AVERAGE GLUCOSE: 186 MG/DL
GFR AFRICAN AMERICAN: >60 ML/MIN
GFR NON-AFRICAN AMERICAN: >60 ML/MIN
GFR SERPL CREATININE-BSD FRML MDRD: ABNORMAL ML/MIN/{1.73_M2}
GFR SERPL CREATININE-BSD FRML MDRD: ABNORMAL ML/MIN/{1.73_M2}
GLUCOSE BLD-MCNC: 130 MG/DL (ref 75–110)
GLUCOSE BLD-MCNC: 138 MG/DL (ref 75–110)
GLUCOSE BLD-MCNC: 174 MG/DL (ref 75–110)
GLUCOSE BLD-MCNC: 176 MG/DL (ref 70–99)
HBA1C MFR BLD: 8.1 % (ref 4–6)
HCT VFR BLD CALC: 38.3 % (ref 41–53)
HEMOGLOBIN: 13.4 G/DL (ref 13.5–17.5)
MAGNESIUM: 1.8 MG/DL (ref 1.6–2.6)
MCH RBC QN AUTO: 30.7 PG (ref 26–34)
MCHC RBC AUTO-ENTMCNC: 35 G/DL (ref 31–37)
MCV RBC AUTO: 87.7 FL (ref 80–100)
NRBC AUTOMATED: ABNORMAL PER 100 WBC
PDW BLD-RTO: 13.2 % (ref 12.5–15.4)
PHOSPHORUS: 3.2 MG/DL (ref 2.5–4.5)
PLATELET # BLD: 206 K/UL (ref 140–450)
PMV BLD AUTO: 8.8 FL (ref 6–12)
POTASSIUM SERPL-SCNC: 4.1 MMOL/L (ref 3.7–5.3)
RBC # BLD: 4.36 M/UL (ref 4.5–5.9)
SODIUM BLD-SCNC: 136 MMOL/L (ref 135–144)
TOTAL PROTEIN: 6.8 G/DL (ref 6.4–8.3)
WBC # BLD: 13.6 K/UL (ref 3.5–11)

## 2021-04-12 PROCEDURE — 82947 ASSAY GLUCOSE BLOOD QUANT: CPT

## 2021-04-12 PROCEDURE — 36415 COLL VENOUS BLD VENIPUNCTURE: CPT

## 2021-04-12 PROCEDURE — 99232 SBSQ HOSP IP/OBS MODERATE 35: CPT | Performed by: INTERNAL MEDICINE

## 2021-04-12 PROCEDURE — 84100 ASSAY OF PHOSPHORUS: CPT

## 2021-04-12 PROCEDURE — 6370000000 HC RX 637 (ALT 250 FOR IP)

## 2021-04-12 PROCEDURE — 1210000000 HC MED SURG R&B

## 2021-04-12 PROCEDURE — 85027 COMPLETE CBC AUTOMATED: CPT

## 2021-04-12 PROCEDURE — 2500000003 HC RX 250 WO HCPCS: Performed by: NURSE PRACTITIONER

## 2021-04-12 PROCEDURE — 6360000002 HC RX W HCPCS: Performed by: NURSE PRACTITIONER

## 2021-04-12 PROCEDURE — 80053 COMPREHEN METABOLIC PANEL: CPT

## 2021-04-12 PROCEDURE — 83735 ASSAY OF MAGNESIUM: CPT

## 2021-04-12 PROCEDURE — 2580000003 HC RX 258: Performed by: NURSE PRACTITIONER

## 2021-04-12 PROCEDURE — APPSS45 APP SPLIT SHARED TIME 31-45 MINUTES: Performed by: NURSE PRACTITIONER

## 2021-04-12 PROCEDURE — 6370000000 HC RX 637 (ALT 250 FOR IP): Performed by: NURSE PRACTITIONER

## 2021-04-12 PROCEDURE — 82330 ASSAY OF CALCIUM: CPT

## 2021-04-12 RX ORDER — ALOGLIPTIN 12.5 MG/1
TABLET, FILM COATED ORAL
Status: COMPLETED
Start: 2021-04-12 | End: 2021-04-12

## 2021-04-12 RX ORDER — NICOTINE POLACRILEX 4 MG
15 LOZENGE BUCCAL PRN
Status: DISCONTINUED | OUTPATIENT
Start: 2021-04-12 | End: 2021-04-12 | Stop reason: SDUPTHER

## 2021-04-12 RX ORDER — DEXTROSE MONOHYDRATE 50 MG/ML
100 INJECTION, SOLUTION INTRAVENOUS PRN
Status: DISCONTINUED | OUTPATIENT
Start: 2021-04-12 | End: 2021-04-12 | Stop reason: SDUPTHER

## 2021-04-12 RX ORDER — DEXTROSE MONOHYDRATE 25 G/50ML
12.5 INJECTION, SOLUTION INTRAVENOUS PRN
Status: DISCONTINUED | OUTPATIENT
Start: 2021-04-12 | End: 2021-04-12 | Stop reason: SDUPTHER

## 2021-04-12 RX ORDER — ALOGLIPTIN 6.25 MG/1
6.25 TABLET, FILM COATED ORAL DAILY
Status: DISCONTINUED | OUTPATIENT
Start: 2021-04-12 | End: 2021-04-14 | Stop reason: HOSPADM

## 2021-04-12 RX ADMIN — ALOGLIPTIN 6.25 MG: 6.25 TABLET, FILM COATED ORAL at 17:45

## 2021-04-12 RX ADMIN — SODIUM CHLORIDE: 9 INJECTION, SOLUTION INTRAVENOUS at 02:16

## 2021-04-12 RX ADMIN — METRONIDAZOLE 500 MG: 500 INJECTION, SOLUTION INTRAVENOUS at 19:36

## 2021-04-12 RX ADMIN — CIPROFLOXACIN 400 MG: 2 INJECTION, SOLUTION INTRAVENOUS at 09:57

## 2021-04-12 RX ADMIN — LISINOPRIL 40 MG: 20 TABLET ORAL at 07:39

## 2021-04-12 RX ADMIN — CIPROFLOXACIN 400 MG: 2 INJECTION, SOLUTION INTRAVENOUS at 21:38

## 2021-04-12 RX ADMIN — METRONIDAZOLE 500 MG: 500 INJECTION, SOLUTION INTRAVENOUS at 03:40

## 2021-04-12 RX ADMIN — HYDROCODONE BITARTRATE AND ACETAMINOPHEN 2 TABLET: 5; 325 TABLET ORAL at 00:45

## 2021-04-12 RX ADMIN — ONDANSETRON 4 MG: 2 INJECTION INTRAMUSCULAR; INTRAVENOUS at 11:55

## 2021-04-12 RX ADMIN — HYDROCODONE BITARTRATE AND ACETAMINOPHEN 1 TABLET: 5; 325 TABLET ORAL at 05:39

## 2021-04-12 RX ADMIN — METRONIDAZOLE 500 MG: 500 INJECTION, SOLUTION INTRAVENOUS at 11:55

## 2021-04-12 RX ADMIN — FAMOTIDINE 20 MG: 10 INJECTION INTRAVENOUS at 21:38

## 2021-04-12 RX ADMIN — FAMOTIDINE 20 MG: 10 INJECTION INTRAVENOUS at 07:39

## 2021-04-12 RX ADMIN — SODIUM CHLORIDE: 9 INJECTION, SOLUTION INTRAVENOUS at 09:57

## 2021-04-12 RX ADMIN — ENOXAPARIN SODIUM 40 MG: 40 INJECTION SUBCUTANEOUS at 07:39

## 2021-04-12 RX ADMIN — ALOGLIPTIN 6.25 MG: 12.5 TABLET, FILM COATED ORAL at 17:45

## 2021-04-12 RX ADMIN — ONDANSETRON 4 MG: 2 INJECTION INTRAMUSCULAR; INTRAVENOUS at 05:39

## 2021-04-12 RX ADMIN — HYDROCODONE BITARTRATE AND ACETAMINOPHEN 1 TABLET: 5; 325 TABLET ORAL at 09:57

## 2021-04-12 ASSESSMENT — ENCOUNTER SYMPTOMS
WHEEZING: 0
CONSTIPATION: 0
BLOOD IN STOOL: 0
DIARRHEA: 0
CHEST TIGHTNESS: 0
SHORTNESS OF BREATH: 0
COUGH: 0
NAUSEA: 1
VOMITING: 0
ABDOMINAL PAIN: 1

## 2021-04-12 ASSESSMENT — PAIN SCALES - GENERAL
PAINLEVEL_OUTOF10: 2
PAINLEVEL_OUTOF10: 5
PAINLEVEL_OUTOF10: 3

## 2021-04-12 NOTE — CARE COORDINATION
Case Management Initial Discharge Plan  Pedro Luis Thorne,             Met with:patient to discuss discharge plans. Information verified: address, contacts, phone number, , insurance Yes    Emergency Contact/Next of Kin name & number: mother Debra Fernández [phone]    PCP: Jesusita Yip MD  Date of last visit: past year - virtual visit    Insurance Provider: MMO    Discharge Planning    Living Arrangements:  Parent   Support Systems:  Parent, Family Members, Friends/Neighbors    Home is a mobile home   4 stairs to climb to get into front door, stairs to climb to reach second floor  Location of bedroom/bathroom in home     Patient able to perform ADL's:Independent    Current Services (outpatient & in home) none  DME equipment:   DME provider:     Receiving oral anticoagulation therapy? No    If indicated:   Physician managing anticoagulation treatment:   Where does patient obtain lab work for ATC treatment? Potential Assistance Needed:  N/A    Patient agreeable to home care: No  [place] of choice provided:  no    Prior SNF/Rehab Placement and Facility: no  Agreeable to SNF/Rehab: No  [place] of choice provided: no     Evaluation: no    Expected Discharge date:  21    Patient expects to be discharged to:  home  Follow Up Appointment: Best Day/ Time: Monday AM    Transportation provider: self  Transportation arrangements needed for discharge: No    Readmission Risk              Risk of Unplanned Readmission:        10             Does patient have a readmission risk score greater than 14?: No  If yes, follow-up appointment must be made within 7 days of discharge.      Goals of Care:       Discharge Plan: home, independent          Electronically signed by Yessica Smith RN on 21 at 10:19 AM EDT

## 2021-04-12 NOTE — PROGRESS NOTES
PATIENT NAME: Chacho Jarrell     TODAY'S DATE: 4/12/2021, 8:03 AM    SUBJECTIVE:    [address] y/o male admitted for diverticulitis with minimal improvement this am.  Pain is slightly improved. The frequency in which he gets waves of sharp left lower quadrant pain is starting to decrease. Patient did not some nausea this am.  Passing flatus but no bowel movements. OBJECTIVE:   VITALS:  /78   Pulse 83   Temp 99.5 °F (37.5 °C) (Oral)   Resp 15   Ht 5' 11\" (1.803 m)   Wt 287 lb 3.2 oz (130.3 kg)   SpO2 97%   BMI 40.06 kg/m²      INTAKE/OUTPUT:      Intake/Output Summary (Last 24 hours) at 4/12/2021 0803  Last data filed at 4/12/2021 0746  Gross per 24 hour   Intake 3883.02 ml   Output 775 ml   Net 3108.02 ml                 CONSTITUTIONAL:  awake and alert. No acute distress  ABDOMEN:   Abdomen soft and nondistended. This is mild left lower quadrant tenderness to palpation but no guarding, no rebound, and no rigidity. Data:  CBC:   Lab Results   Component Value Date    WBC 13.6 04/12/2021    RBC 4.36 04/12/2021    HGB 13.4 04/12/2021    HCT 38.3 04/12/2021    MCV 87.7 04/12/2021    MCH 30.7 04/12/2021    MCHC 35.0 04/12/2021    RDW 13.2 04/12/2021     04/12/2021    MPV 8.8 04/12/2021     ASSESSMENT   1. Acute diverticulitis    Plan  1. Patient with slight interval improvement in physical exam.  Leukocytosis is decreasing. Will continue to manage nonoperatively. Continue IV antibiotics. Will reevaluate later this afternoon. If continues to clinically improve, will considers clears this evening.       Electronically signed by Tre Plata IV, DO  on 4/12/2021 at 8:03 AM

## 2021-04-12 NOTE — PROGRESS NOTES
[facility]  Office: [address], DO, Lopez Osorio, DO, Radhikabob Edwards, DO, Ismael Forman Blood, DO, Teo Flowers MD, Minoo Brown MD, Ingrid Velasquez MD, Bonnielee Nissen, MD, Angela Cosby MD, Desiree Solis MD, Marie Arevalo MD, Simon Triplett MD, Yuniel Peacock, DO, Shyla Ramirez MD, Jocy Feliz, DO, Rachel Rubio MD,  Asher Boston DO, Keyanna Castellanos MD, Victoriano Poon MD, Kian Xiong MD, Quyen Arriola MD, Lena Roblero, Latonia Vaughan, CNP, Sarah Kunz, CNP, Brittnee Bales, CNS, Adriana He, CNP, Ravi Mckeon, CNP, Rachelle Craig, CNP, So Buchanan, CNP, Johan Han, CNP, JERRICA AragonC, Howard Galarza, [facility], Jaziel Enriquez, CNP, Saqib Rutledge, CNP, Jose Mccauley, CNP, Daniel Apodaca, CNP, Liseth Almanza, CNP, Vickey Farias [phone]    Progress Note    4/12/2021    10:52 AM    Name:   Ritesh Schaefer  MRN:     [id number]     Acct:      [de-identified]   Room:   [address] [place] [address] Day:  1  Admit Date:  4/11/2021  9:20 AM    PCP:   Geovanna Smyth MD  Code Status:  Full Code    Subjective:     C/C:   Chief Complaint   Patient presents with    Abdominal Pain     Interval History Status: improved. Slight improvement overnight. He continues to complain of intermittent cramping/sharp abdominal pain he rates a 10/10 prior to prn's. Said after pain meds it is tolerable. He is also requested Zofran intermittently for continued nausea but no emesis overnight. He states he is passing gas but no BM. He denies fever but he has had some chills. He thinks the nausea is because he has not anything to eat or drink and is receiving antibiotics pain medications. I explained to him why he is now allowed to eat or drink yet and he understands. I did encourage him to ambulate in the reyna 2 or 3 times a shift     Brief History:     Per previous documentation    Ritesh Schaefer is a 31 y. o. Non-/non  male who presents with Abdominal Pain   and is admitted to the hospital for the management of Diverticulitis of large intestine with perforation.     The patient reports going out Friday night with friends; movie (he had popcorn)  and drinks. Symptoms started around 9pm with  Generalized Abdominal cramping. He states Sat the cramping got worse and he started having stabbing pain. He says as the day went on he continued feeling worse and later developed a fever as high 101. 4. he reports chills, nausea and one emesis. He said the emesis was probably the food he ate earlier. He reports of history of diverticulitis about 3 yrs that didn't require hospitalization.      CT shows Acute diverticulitis of the mid sigmoid colon, with mild localized   Perforation/pneumoperitoneum. Temp on arrival 101 and he was tachycardic at 129  WBC 16.4  Sodium 131  Initial lactic 2.9 after fluids 1.7  Glucose 177     Dr Manohar Christian has been consulted. He recommended Cipro and flagyl IV    Last recorded temp was 37.5 this morning. Overnight his temp was as high as 38.0. No further tachycardia. Dehydration improved. Sodium at 136. His glucose is still in the 170s. A1c is pending. White count down from 16.4-13. 6. Procalcitonin 0.31. Blood cultures x2 showing no growth. Review of Systems:     Review of Systems   Constitutional: Positive for chills. Negative for diaphoresis and fever. HENT: Negative for congestion. Eyes: Negative for visual disturbance. Respiratory: Negative for cough, chest tightness, shortness of breath and wheezing. Cardiovascular: Negative for chest pain, palpitations and leg swelling. Gastrointestinal: Positive for abdominal pain and nausea. Negative for blood in stool, constipation, diarrhea and vomiting. Genitourinary: Negative for difficulty urinating. Neurological: Negative for dizziness, weakness, light-headedness, numbness and headaches.    All other systems reviewed and are negative. Medications: Allergies:  No Known Allergies    Current Meds:   Scheduled Meds:    lisinopril  40 mg Oral Daily    sodium chloride flush  5-40 mL Intravenous 2 times per day    famotidine (PEPCID) injection  20 mg Intravenous BID    enoxaparin  40 mg Subcutaneous Daily    ciprofloxacin  400 mg Intravenous Q12H    metroNIDAZOLE  500 mg Intravenous Q8H    nicotine  1 patch Transdermal Daily     Continuous Infusions:    sodium chloride 150 mL/hr at 21 0957    sodium chloride      dextrose       PRN Meds: sodium chloride flush, sodium chloride, potassium chloride, magnesium sulfate, acetaminophen, HYDROcodone 5 mg - acetaminophen **OR** HYDROcodone 5 mg - acetaminophen, HYDROmorphone **OR** HYDROmorphone, promethazine **OR** ondansetron, glucose, dextrose, glucagon (rDNA), dextrose    Data:     Past Medical History:   has a past medical history of dicerticullits, Diverticulitis of large intestine with perforation, Hypertension, and Tobacco abuse. Social History:   reports that he has been smoking. He has a 3.75 pack-year smoking history. He has never used smokeless tobacco. He reports current alcohol use. He reports that he does not use drugs. Family History:   Family History   Problem Relation Age of Onset    Diabetes Mother     Diabetes Father     Diabetes Sister     Diabetes Brother        Vitals:  /78   Pulse 83   Temp 99.5 °F (37.5 °C) (Oral)   Resp 15   Ht 5' 11\" (1.803 m)   Wt 287 lb 3.2 oz (130.3 kg)   SpO2 97%   BMI 40.06 kg/m²   Temp (24hrs), Av.5 °F (37.5 °C), Min:98.1 °F (36.7 °C), Max:101.2 °F (38.4 °C)    Recent Labs     21  1245 21  1647 21  2124 21  0545   POCGLU 157* 113* 125* 174*       I/O (24Hr):     Intake/Output Summary (Last 24 hours) at 2021 1052  Last data filed at 2021 1016  Gross per 24 hour   Intake 3883.02 ml   Output 1125 ml   Net 2758.02 ml       Labs:  Hematology:  Recent Labs 04/11/21  0945 04/12/21  0547   WBC 16.4* 13.6*   RBC 5.06 4.36*   HGB 15.1 13.4*   HCT 44.3 38.3*   MCV 87.6 87.7   MCH 29.8 30.7   MCHC 34.1 35.0   RDW 13.0 13.2    206   MPV 8.6 8.8   INR 1.0  --      Chemistry:  Recent Labs     04/11/21  0945 04/11/21  1251 04/12/21  0547   *  --  136   K 3.7  --  4.1   CL 95*  --  102   CO2 25  --  25   GLUCOSE 177*  --  176*   BUN 9  --  9   CREATININE 0.85  --  0.76   MG  --   --  1.8   ANIONGAP 11  --  9   LABGLOM >60  --  >60   GFRAA >60  --  >60   CALCIUM 9.3  --  8.7   CAION  --   --  1.16   PHOS  --   --  3.2   LACTACIDWB  --  NOT REPORTED  --      Recent Labs     04/11/21  0945 04/11/21  1245 04/11/21  1647 04/11/21  2124 04/12/21  0545 04/12/21  0547   PROT 7.7  --   --   --   --  6.8   LABALBU 4.1  --   --   --   --  3.8   AST 30  --   --   --   --  25   ALT 75*  --   --   --   --  52*   ALKPHOS 86  --   --   --   --  71   BILITOT 0.84  --   --   --   --  0.78   POCGLU  --  157* 113* 125* 174*  --      ABG:No results found for: POCPH, PHART, PH, POCPCO2, BCI6VIF, PCO2, POCPO2, PO2ART, PO2, POCHCO3, FQR6IDN, HCO3, NBEA, PBEA, BEART, BE, THGBART, THB, EPO4KWG, DRWD8XMT, X3BIAPYF, O2SAT, FIO2  Lab Results   Component Value Date/Time    SPECIAL 20 ML RIGHT ARM 04/11/2021 10:15 AM     Lab Results   Component Value Date/Time    CULTURE NO GROWTH 19 HOURS 04/11/2021 10:15 AM       Radiology:  Ct Abdomen Pelvis Wo Contrast Additional Contrast? None    Result Date: 4/11/2021  Acute diverticulitis of the mid sigmoid colon, with mild localized perforation/pneumoperitoneum. Critical results were called by Dr. Ana Byers MD to Bibi Ivy on 4/11/2021 at 10:41. Physical Examination:     Physical Exam  Vitals signs and nursing note reviewed. Constitutional:       Appearance: Normal appearance. HENT:      Mouth/Throat:      Mouth: Mucous membranes are moist.   Eyes:      Extraocular Movements: Extraocular movements intact.    Neck: Musculoskeletal: Neck supple. Cardiovascular:      Rate and Rhythm: Normal rate and regular rhythm. Pulses: Normal pulses. Heart sounds: Normal heart sounds. Pulmonary:      Effort: Pulmonary effort is normal.      Breath sounds: Normal breath sounds. Abdominal:      General: Bowel sounds are decreased. Palpations: Abdomen is soft. Tenderness: There is abdominal tenderness in the epigastric area and periumbilical area. There is no guarding or rebound. Musculoskeletal: Normal range of motion. Skin:     General: Skin is warm and dry. Capillary Refill: Capillary refill takes less than 2 seconds. Neurological:      Mental Status: He is alert and oriented to person, place, and time. Psychiatric:         Mood and Affect: Mood normal.         Assessment:     Hospital Problems           Last Modified POA    * (Principal) Diverticulitis of large intestine with perforation 4/11/2021 Yes    Tobacco abuse 4/11/2021 Yes    Hypertension 4/11/2021 Yes    Diverticular disease of large intestine with complication 2/10/5831 Yes    Obesity, Class III, BMI 40-49.9 (morbid obesity) ([address] 4/11/2021 Yes    Hyponatremia 4/11/2021 Yes    Septicemia ([address] 4/11/2021 Yes          Plan:     Septicemia/diverticulitis with perforation/pneumoperitoneum; general surgery following. NPO, Cipro and Flagyl IV. IV fluids,  PPI. Patient encouraged to follow diverticulosis diet/restrictions. May trial clears later this evening per surgery     Encourage smoking cessation     Hyponatremia improved. Continue to monitor    Class III obesity; patient would benefit from lifestyle modification. Follow-up with PCP outpatient     HTN; noncompliance with medication. Compliance encouraged     Strong family history of diabetes and elevated glucose on admission.  Check a1c and glucose ac/hs     DVT prophylaxis    Ambulation encouraged    SCAR Lo - CNP  4/12/2021  10:52 AM

## 2021-04-13 LAB
ANION GAP SERPL CALCULATED.3IONS-SCNC: 10 MMOL/L (ref 9–17)
BUN BLDV-MCNC: 6 MG/DL (ref 6–20)
BUN/CREAT BLD: ABNORMAL (ref 9–20)
CALCIUM SERPL-MCNC: 9 MG/DL (ref 8.6–10.4)
CHLORIDE BLD-SCNC: 104 MMOL/L (ref 98–107)
CO2: 27 MMOL/L (ref 20–31)
CREAT SERPL-MCNC: 0.77 MG/DL (ref 0.7–1.2)
GFR AFRICAN AMERICAN: >60 ML/MIN
GFR NON-AFRICAN AMERICAN: >60 ML/MIN
GFR SERPL CREATININE-BSD FRML MDRD: ABNORMAL ML/MIN/{1.73_M2}
GFR SERPL CREATININE-BSD FRML MDRD: ABNORMAL ML/MIN/{1.73_M2}
GLUCOSE BLD-MCNC: 128 MG/DL (ref 70–99)
GLUCOSE BLD-MCNC: 131 MG/DL (ref 75–110)
GLUCOSE BLD-MCNC: 132 MG/DL (ref 75–110)
GLUCOSE BLD-MCNC: 155 MG/DL (ref 75–110)
HCT VFR BLD CALC: 39.1 % (ref 41–53)
HEMOGLOBIN: 13.2 G/DL (ref 13.5–17.5)
MCH RBC QN AUTO: 29.8 PG (ref 26–34)
MCHC RBC AUTO-ENTMCNC: 33.7 G/DL (ref 31–37)
MCV RBC AUTO: 88.5 FL (ref 80–100)
NRBC AUTOMATED: ABNORMAL PER 100 WBC
PDW BLD-RTO: 13 % (ref 12.5–15.4)
PLATELET # BLD: 216 K/UL (ref 140–450)
PMV BLD AUTO: 8.7 FL (ref 6–12)
POTASSIUM SERPL-SCNC: 4.1 MMOL/L (ref 3.7–5.3)
RBC # BLD: 4.42 M/UL (ref 4.5–5.9)
SODIUM BLD-SCNC: 141 MMOL/L (ref 135–144)
WBC # BLD: 10.6 K/UL (ref 3.5–11)

## 2021-04-13 PROCEDURE — 6360000002 HC RX W HCPCS: Performed by: NURSE PRACTITIONER

## 2021-04-13 PROCEDURE — 6370000000 HC RX 637 (ALT 250 FOR IP): Performed by: NURSE PRACTITIONER

## 2021-04-13 PROCEDURE — 99232 SBSQ HOSP IP/OBS MODERATE 35: CPT | Performed by: INTERNAL MEDICINE

## 2021-04-13 PROCEDURE — 36415 COLL VENOUS BLD VENIPUNCTURE: CPT

## 2021-04-13 PROCEDURE — 6370000000 HC RX 637 (ALT 250 FOR IP)

## 2021-04-13 PROCEDURE — 2500000003 HC RX 250 WO HCPCS: Performed by: NURSE PRACTITIONER

## 2021-04-13 PROCEDURE — 82947 ASSAY GLUCOSE BLOOD QUANT: CPT

## 2021-04-13 PROCEDURE — 85027 COMPLETE CBC AUTOMATED: CPT

## 2021-04-13 PROCEDURE — 1210000000 HC MED SURG R&B

## 2021-04-13 PROCEDURE — APPSS30 APP SPLIT SHARED TIME 16-30 MINUTES: Performed by: NURSE PRACTITIONER

## 2021-04-13 PROCEDURE — 80048 BASIC METABOLIC PNL TOTAL CA: CPT

## 2021-04-13 PROCEDURE — 2580000003 HC RX 258: Performed by: NURSE PRACTITIONER

## 2021-04-13 RX ORDER — METRONIDAZOLE 500 MG/1
500 TABLET ORAL EVERY 8 HOURS SCHEDULED
Status: DISCONTINUED | OUTPATIENT
Start: 2021-04-14 | End: 2021-04-14 | Stop reason: HOSPADM

## 2021-04-13 RX ORDER — ALOGLIPTIN 12.5 MG/1
TABLET, FILM COATED ORAL
Status: COMPLETED
Start: 2021-04-13 | End: 2021-04-13

## 2021-04-13 RX ORDER — DIPHENHYDRAMINE HYDROCHLORIDE 50 MG/ML
25 INJECTION INTRAMUSCULAR; INTRAVENOUS EVERY 6 HOURS PRN
Status: DISCONTINUED | OUTPATIENT
Start: 2021-04-13 | End: 2021-04-14 | Stop reason: HOSPADM

## 2021-04-13 RX ADMIN — CIPROFLOXACIN 400 MG: 2 INJECTION, SOLUTION INTRAVENOUS at 21:41

## 2021-04-13 RX ADMIN — SODIUM CHLORIDE: 9 INJECTION, SOLUTION INTRAVENOUS at 00:26

## 2021-04-13 RX ADMIN — INSULIN LISPRO 1 UNITS: 100 INJECTION, SOLUTION INTRAVENOUS; SUBCUTANEOUS at 21:39

## 2021-04-13 RX ADMIN — FAMOTIDINE 20 MG: 10 INJECTION INTRAVENOUS at 08:06

## 2021-04-13 RX ADMIN — FAMOTIDINE 20 MG: 10 INJECTION INTRAVENOUS at 19:51

## 2021-04-13 RX ADMIN — METRONIDAZOLE 500 MG: 500 INJECTION, SOLUTION INTRAVENOUS at 03:59

## 2021-04-13 RX ADMIN — ALOGLIPTIN 6.25 MG: 12.5 TABLET, FILM COATED ORAL at 08:07

## 2021-04-13 RX ADMIN — ALOGLIPTIN 6.25 MG: 6.25 TABLET, FILM COATED ORAL at 08:07

## 2021-04-13 RX ADMIN — METRONIDAZOLE 500 MG: 500 INJECTION, SOLUTION INTRAVENOUS at 19:51

## 2021-04-13 RX ADMIN — LISINOPRIL 40 MG: 20 TABLET ORAL at 08:06

## 2021-04-13 RX ADMIN — SODIUM CHLORIDE, PRESERVATIVE FREE 10 ML: 5 INJECTION INTRAVENOUS at 19:51

## 2021-04-13 RX ADMIN — METRONIDAZOLE 500 MG: 500 INJECTION, SOLUTION INTRAVENOUS at 11:52

## 2021-04-13 RX ADMIN — CIPROFLOXACIN 400 MG: 2 INJECTION, SOLUTION INTRAVENOUS at 09:48

## 2021-04-13 ASSESSMENT — PAIN SCALES - GENERAL: PAINLEVEL_OUTOF10: 0

## 2021-04-13 ASSESSMENT — PAIN DESCRIPTION - PAIN TYPE: TYPE: ACUTE PAIN

## 2021-04-13 ASSESSMENT — PAIN DESCRIPTION - LOCATION: LOCATION: NECK

## 2021-04-13 NOTE — PROGRESS NOTES
Transdermal Daily     Continuous Infusions:    sodium chloride 75 mL/hr at 21 0026    sodium chloride      dextrose       PRN Meds: sodium chloride flush, sodium chloride, potassium chloride, magnesium sulfate, acetaminophen, HYDROcodone 5 mg - acetaminophen **OR** HYDROcodone 5 mg - acetaminophen, HYDROmorphone **OR** HYDROmorphone, promethazine **OR** ondansetron, glucose, dextrose, glucagon (rDNA), dextrose    Data:     Past Medical History:   has a past medical history of dicerticullits, Diverticulitis of large intestine with perforation, Hypertension, and Tobacco abuse. Social History:   reports that he has been smoking. He has a 3.75 pack-year smoking history. He has never used smokeless tobacco. He reports current alcohol use. He reports that he does not use drugs. Family History:   Family History   Problem Relation Age of Onset    Diabetes Mother     Diabetes Father     Diabetes Sister     Diabetes Brother        Vitals:  /81   Pulse 77   Temp 98.5 °F (36.9 °C) (Oral)   Resp 20   Ht 5' 11\" (1.803 m)   Wt 289 lb 8 oz (131.3 kg)   SpO2 96%   BMI 40.38 kg/m²   Temp (24hrs), Av.4 °F (36.9 °C), Min:98.1 °F (36.7 °C), Max:98.7 °F (37.1 °C)    Recent Labs     21  0545 21  1649 21  2000 21  1124   POCGLU 174* 130* 138* 132*       I/O (24Hr):     Intake/Output Summary (Last 24 hours) at 2021 1224  Last data filed at 2021 1018  Gross per 24 hour   Intake 2344 ml   Output 3650 ml   Net -1306 ml       Labs:  Hematology:  Recent Labs     21  0945 21  0547 21  0546   WBC 16.4* 13.6* 10.6   RBC 5.06 4.36* 4.42*   HGB 15.1 13.4* 13.2*   HCT 44.3 38.3* 39.1*   MCV 87.6 87.7 88.5   MCH 29.8 30.7 29.8   MCHC 34.1 35.0 33.7   RDW 13.0 13.2 13.0    206 216   MPV 8.6 8.8 8.7   INR 1.0  --   --      Chemistry:  Recent Labs     21  0945 21  1251 21  0547 21  0546   *  --  136 141   K 3.7  --  4.1 4.1   CL 95* --  102 104   CO2 25  --  25 27   GLUCOSE 177*  --  176* 128*   BUN 9  --  9 6   CREATININE 0.85  --  0.76 0.77   MG  --   --  1.8  --    ANIONGAP 11  --  9 10   LABGLOM >60  --  >60 >60   GFRAA >60  --  >60 >60   CALCIUM 9.3  --  8.7 9.0   CAION  --   --  1.16  --    PHOS  --   --  3.2  --    LACTACIDWB  --  NOT REPORTED  --   --      Recent Labs     04/11/21  0945 04/11/21  0945 04/11/21  1647 04/11/21 2124 04/12/21  0545 04/12/21  0547 04/12/21 1649 04/12/21 2000 04/13/21  1124   PROT 7.7  --   --   --   --  6.8  --   --   --    LABALBU 4.1  --   --   --   --  3.8  --   --   --    LABA1C 8.1*  --   --   --   --   --   --   --   --    AST 30  --   --   --   --  25  --   --   --    ALT 75*  --   --   --   --  52*  --   --   --    ALKPHOS 86  --   --   --   --  71  --   --   --    BILITOT 0.84  --   --   --   --  0.78  --   --   --    POCGLU  --    < > 113* 125* 174*  --  130* 138* 132*    < > = values in this interval not displayed. ABG:  Lab Results   Component Value Date/Time    SPECIAL 20 ML RIGHT ARM 04/11/2021 10:15 AM     Lab Results   Component Value Date/Time    CULTURE NO GROWTH 2 DAYS 04/11/2021 10:15 AM       Radiology:  Ct Abdomen Pelvis Wo Contrast Additional Contrast? None    Result Date: 4/11/2021  Acute diverticulitis of the mid sigmoid colon, with mild localized perforation/pneumoperitoneum. Critical results were called by Dr. Eben Parks MD to Mya Tobias on 4/11/2021 at 10:41.        Physical Examination:       General appearance:  alert, cooperative and no distress  Mental Status:  oriented to person, place and time and normal affect  Lungs:  clear to auscultation bilaterally, normal effort  Heart:  regular rate and rhythm, no murmur  Abdomen:  soft, + tender, nondistended, normal bowel sounds, no masses, hepatomegaly, splenomegaly  Extremities:  no edema, redness, tenderness in the calves  Skin:  no gross lesions, rashes, induration    Assessment:     Hospital Problems Last Modified POA    * (Principal) Diverticulitis of large intestine with perforation 4/11/2021 Yes    Tobacco abuse 4/11/2021 Yes    Hypertension 4/11/2021 Yes    Diverticular disease of large intestine with complication 4/85/7564 Yes    Obesity, Class III, BMI 40-49.9 (morbid obesity) ([facility] [address] 4/11/2021 Yes    Hyponatremia 4/11/2021 Yes    Septicemia ([facility] [address] [facility] 4/11/2021 Yes    New onset type 2 diabetes mellitus ([facility] [facility] 4/12/2021 Yes          Plan:     Septicemia/diverticulitis with perforation/pneumoperitoneum; general surgery following. Cipro and Flagyl IV. Discontinue IV fluids,  PPI. Will switch IV Cipro and Flagyl to PO in AM.      Encourage smoking cessation      Hyponatremia resolved. BMP in AM.     Class III obesity; patient would benefit from lifestyle modification. Follow-up with PCP outpatient     HTN; noncompliance with medication. Compliance encouraged. Blood pressure well controlled at this time. Continue to monitor.     Strong family history of diabetes and elevated glucose on admission. A1c noted to be 8.1. Consider discharge home on Metformin. Monitor blood glucose before meals and at bedtime.   Sliding scale insulin as ordered.     DVT prophylaxis     Ambulation encouraged    SCAR Venegas NP  4/13/2021  12:24 PM

## 2021-04-13 NOTE — PLAN OF CARE
Problem: Pain:  Goal: Pain level will decrease  Description: Pain level will decrease  Outcome: Ongoing  Patient c/o neck stiffness during the night. Cold compress given per patient request.   Vital completed. Will continue to monitor. Patient instructed to call out with new onset of pain or unrelieved pain      Problem: Daily Care:  Goal: Daily care needs are met  Description: Daily care needs are met  Outcome: Ongoing     Problem: Infection:  Goal: Will remain free from infection  Description: Will remain free from infection  Outcome: Ongoing   Afebrile this shift.

## 2021-04-14 VITALS
SYSTOLIC BLOOD PRESSURE: 128 MMHG | TEMPERATURE: 98.1 F | HEIGHT: 71 IN | RESPIRATION RATE: 18 BRPM | OXYGEN SATURATION: 96 % | DIASTOLIC BLOOD PRESSURE: 85 MMHG | HEART RATE: 94 BPM | WEIGHT: 289.8 LBS | BODY MASS INDEX: 40.57 KG/M2

## 2021-04-14 PROBLEM — E87.1 HYPONATREMIA: Status: RESOLVED | Noted: 2021-04-11 | Resolved: 2021-04-14

## 2021-04-14 LAB
ANION GAP SERPL CALCULATED.3IONS-SCNC: 10 MMOL/L (ref 9–17)
BUN BLDV-MCNC: 8 MG/DL (ref 6–20)
BUN/CREAT BLD: ABNORMAL (ref 9–20)
CALCIUM SERPL-MCNC: 9.3 MG/DL (ref 8.6–10.4)
CHLORIDE BLD-SCNC: 102 MMOL/L (ref 98–107)
CO2: 27 MMOL/L (ref 20–31)
CREAT SERPL-MCNC: 0.73 MG/DL (ref 0.7–1.2)
GFR AFRICAN AMERICAN: >60 ML/MIN
GFR NON-AFRICAN AMERICAN: >60 ML/MIN
GFR SERPL CREATININE-BSD FRML MDRD: ABNORMAL ML/MIN/{1.73_M2}
GFR SERPL CREATININE-BSD FRML MDRD: ABNORMAL ML/MIN/{1.73_M2}
GLUCOSE BLD-MCNC: 128 MG/DL (ref 70–99)
HCT VFR BLD CALC: 40.8 % (ref 41–53)
HEMOGLOBIN: 13.9 G/DL (ref 13.5–17.5)
MCH RBC QN AUTO: 29.9 PG (ref 26–34)
MCHC RBC AUTO-ENTMCNC: 34.1 G/DL (ref 31–37)
MCV RBC AUTO: 87.7 FL (ref 80–100)
NRBC AUTOMATED: ABNORMAL PER 100 WBC
PDW BLD-RTO: 12.9 % (ref 12.5–15.4)
PLATELET # BLD: 254 K/UL (ref 140–450)
PMV BLD AUTO: 8.2 FL (ref 6–12)
POTASSIUM SERPL-SCNC: 4.2 MMOL/L (ref 3.7–5.3)
RBC # BLD: 4.65 M/UL (ref 4.5–5.9)
SODIUM BLD-SCNC: 139 MMOL/L (ref 135–144)
WBC # BLD: 7.3 K/UL (ref 3.5–11)

## 2021-04-14 PROCEDURE — 99239 HOSP IP/OBS DSCHRG MGMT >30: CPT | Performed by: NURSE PRACTITIONER

## 2021-04-14 PROCEDURE — 85027 COMPLETE CBC AUTOMATED: CPT

## 2021-04-14 PROCEDURE — 6370000000 HC RX 637 (ALT 250 FOR IP): Performed by: NURSE PRACTITIONER

## 2021-04-14 PROCEDURE — 36415 COLL VENOUS BLD VENIPUNCTURE: CPT

## 2021-04-14 PROCEDURE — 6370000000 HC RX 637 (ALT 250 FOR IP)

## 2021-04-14 PROCEDURE — 80048 BASIC METABOLIC PNL TOTAL CA: CPT

## 2021-04-14 RX ORDER — ALOGLIPTIN 12.5 MG/1
TABLET, FILM COATED ORAL
Status: COMPLETED
Start: 2021-04-14 | End: 2021-04-14

## 2021-04-14 RX ORDER — CIPROFLOXACIN 500 MG/1
500 TABLET, FILM COATED ORAL EVERY 12 HOURS SCHEDULED
Qty: 22 TABLET | Refills: 0 | Status: SHIPPED | OUTPATIENT
Start: 2021-04-14 | End: 2021-04-25

## 2021-04-14 RX ORDER — METRONIDAZOLE 500 MG/1
500 TABLET ORAL EVERY 8 HOURS SCHEDULED
Qty: 33 TABLET | Refills: 0 | Status: SHIPPED | OUTPATIENT
Start: 2021-04-14 | End: 2021-04-25

## 2021-04-14 RX ORDER — CIPROFLOXACIN 250 MG/1
500 TABLET, FILM COATED ORAL EVERY 12 HOURS SCHEDULED
Status: DISCONTINUED | OUTPATIENT
Start: 2021-04-14 | End: 2021-04-14 | Stop reason: HOSPADM

## 2021-04-14 RX ADMIN — ALOGLIPTIN 6.25 MG: 12.5 TABLET, FILM COATED ORAL at 08:18

## 2021-04-14 RX ADMIN — LISINOPRIL 40 MG: 20 TABLET ORAL at 08:18

## 2021-04-14 RX ADMIN — ALOGLIPTIN 6.25 MG: 6.25 TABLET, FILM COATED ORAL at 08:18

## 2021-04-14 RX ADMIN — CIPROFLOXACIN HYDROCHLORIDE 500 MG: 250 TABLET, FILM COATED ORAL at 08:18

## 2021-04-14 RX ADMIN — METRONIDAZOLE 500 MG: 500 TABLET ORAL at 06:01

## 2021-04-14 NOTE — FLOWSHEET NOTE
D/c instructions reviewed with pt and all questions answered. Work not provided by Carolann Sorto NP, as well. IV was removed without complication by previous shift RN. Pt stable and free of any distress. He gathered all of his belongings and is waiting for his mother to pick him up at 1000. This note will be updated when pt leaves unit. 1020: Pt's mother arrived. Pt ambulating independently off unit without any distress. No belongings left behind.

## 2021-04-14 NOTE — DISCHARGE SUMMARY
[facility]  Office: [address], DO, Sabinajoshruslan Meagan, DO, Lane Jaramillo, DO, Brady London Blood, DO, Serena Garcia MD, Lindsey Monroe MD, Hayes Nguyen MD, Elizabeth Maldonado MD, Amanda Cody MD, Kelvin Orr MD, Shanthi Schultz MD, Myron Swain MD, Malka Daniels, DO, Elly Fang MD, Swati Dominguez, DO, Yong Gonzales MD,  Benito Castrejon DO, Dangelo Bullock MD, Amy Stoddard MD, Moni Garrido MD, Mark Anthony Newton MD, Consuelo Mendoza, Barron Stewart, CNP, Vonnie Chirinos, CNP, Breana Weller, CNS, Nish Mcgregor, CNP, Gely Cuadra, CNP, Gregory Portillo, CNP, Tonia Madrid, CNP, Lc Ramirez, CNP, JERRICA HadleyC, Zohaib Ruiz, [facility], Franny Grant, CNP, Romain Gonzalez, CNP, Karin Saleh, CNP, Luis Carlos Cortés, CNP, Willy Munoz, CNP, Vickey [address]    Discharge Summary     Patient ID: Estrella Allen  :  1985   MRN: [id number]     ACCOUNT:  [de-identified]   Patient's PCP: Bonnie Mclean MD  Admit Date: 2021   Discharge Date: 2021   Length of Stay: 3  Code Status:  Full Code  Admitting Physician: Elyl Fang MD  Discharge Physician: SCAR Kulkarni NP     Active Discharge Diagnoses:     Hospital Problem Lists:  Principal Problem:    Diverticulitis of large intestine with perforation  Active Problems:    Tobacco abuse    Hypertension    Diverticular disease of large intestine with complication    Obesity, Class III, BMI 40-49.9 (morbid obesity) ([facility] [address] [facility]    New onset type 2 diabetes mellitus ([facility] [address]  Resolved Problems:    Hyponatremia    Septicemia [facility]      Admission Condition:  fair     Discharged Condition: good    Hospital Stay:     Hospital Course: Estrella Allen is a 31 y.o. male who was admitted for the management of  Diverticulitis of large intestine with perforation , presented to ER with Abdominal Pain    He went out Friday night previous to admission and ate popcorn.  His symptoms presented after eating the popcorn later that evening and presented as general abdominal cramping and worsened throughout the following day. He also developed a fever chills and nausea along with emesis. He has known history of diverticulitis proximately 3 years ago that did not require hospitalization. CT scan of the abdomen revealed acute diverticulitis of the mid sigmoid colon with mild localized perforation/pneumoperitoneum. General surgery was consulted and he was started on IV Cipro and Flagyl along with IV hydration. His abdominal pain and labs improved with IV fluids and IV antibiotic. Diet was advanced to low fiber and patient tolerated well. Patient was also found to have type 2 diabetes. He received sliding scale insulin along with glucose monitoring during this hospitalization. He was discharged home with Januvia and instructions to follow-up with PCP for further management of newly diagnosed type 2 diabetes. He was also discharged home with oral Cipro and Flagyl to complete antimicrobial therapy for the diverticulitis with perforation. He will follow-up with general surgery in approximately 2 weeks. Significant therapeutic interventions:     IV hydration  Bowel rest  General surgery consultation and evaluation  IV Cipro  IV Flagyl  Glycemic control. Sliding scale insulin. Initiation of oral antidiabetic.     Significant Diagnostic Studies: As above    Labs / Micro:  CBC:   Lab Results   Component Value Date    WBC 7.3 04/14/2021    RBC 4.65 04/14/2021    HGB 13.9 04/14/2021    HCT 40.8 04/14/2021    MCV 87.7 04/14/2021    MCH 29.9 04/14/2021    MCHC 34.1 04/14/2021    RDW 12.9 04/14/2021     04/14/2021     BMP:    Lab Results   Component Value Date    GLUCOSE 128 04/14/2021     04/14/2021    K 4.2 04/14/2021     04/14/2021    CO2 27 04/14/2021    ANIONGAP 10 04/14/2021    BUN 8 04/14/2021    CREATININE 0.73 04/14/2021    BUNCRER NOT REPORTED 04/14/2021    CALCIUM 9.3 splenomegaly  Extremities:  no edema, redness, tenderness in the calves  Skin:  no gross lesions, rashes, induration    Discharge plan:     Disposition: Home    Physician Follow Up:     Dexter Plata IV, DO [address]  [address]  [phone]    Schedule an appointment as soon as possible for a visit in 2 weeks  follow up with general surgery in 2 weeks. Call if abdominal pain returns or symptoms don't continue to improve    Slade Roth MD  [address]   [phone]    Schedule an appointment as soon as possible for a visit in 1 week  post hospital follow up visit within 1 week       Requiring Further Evaluation/Follow Up POST HOSPITALIZATION/Incidental Findings:     Repeat A1c in 2 to 3 months. Diabetic education. Recommend weight loss. Diet: diabetic diet and Low fiber    Activity: As tolerated    Instructions to Patient:     Take medications as prescribed    Follow a low fiber carb controlled diet    Follow-up with general surgery in 2 weeks    Follow-up with PCP within 1 week. Discuss further follow-up/management of newly diagnosed diabetes with PCP. Do not drink alcohol while taking Flagyl and for 1 week after completing Flagyl course.         Discharge Medications:      Medication List      START taking these medications    ciprofloxacin 500 MG tablet  Commonly known as: CIPRO  Take 1 tablet by mouth every 12 hours for 11 days     metroNIDAZOLE 500 MG tablet  Commonly known as: FLAGYL  Take 1 tablet by mouth every 8 hours for 11 days     SITagliptin 25 MG tablet  Commonly known as: Januvia  Take 1 tablet by mouth daily        CONTINUE taking these medications    lisinopril 40 MG tablet  Commonly known as: PRINIVIL;ZESTRIL           Where to Get Your Medications      These medications were sent to [address] [place] [phone] [facility] [phone] [facility] [phone]  [address] [facility] [address]    Phone: [phone]   · ciprofloxacin 500 MG tablet  · metroNIDAZOLE 500 MG tablet  · SITagliptin 25 MG tablet         No discharge procedures on file. Time Spent on discharge is  31 mins in patient examination, evaluation, counseling as well as medication reconciliation, prescriptions for required medications, discharge plan and follow up. Electronically signed by   SCAR Mcwilliams NP  4/14/2021  9:00 AM      Thank you Dr. Jose Reno MD for the opportunity to be involved in this patient's care.

## 2021-04-14 NOTE — PROGRESS NOTES
PATIENT NAME: Karyn Rainey     TODAY'S DATE: 4/14/2021, 11:09 AM    SUBJECTIVE:    30 y/o male with diverticulitis doing well today. Denies abdominal pain. Tolerating regular diet with no nausea or vomiting. Denies fevers, chills, or sweats. Moving bowels. OBJECTIVE:   VITALS:  /85   Pulse 94   Temp 98.1 °F (36.7 °C) (Oral)   Resp 18   Ht 5' 11\" (1.803 m)   Wt 289 lb 12.8 oz (131.5 kg)   SpO2 96%   BMI 40.42 kg/m²      INTAKE/OUTPUT:      Intake/Output Summary (Last 24 hours) at 4/14/2021 1109  Last data filed at 4/14/2021 0854  Gross per 24 hour   Intake 1849 ml   Output 1925 ml   Net -76 ml                 CONSTITUTIONAL:  awake and alert. No acute distress  ABDOMEN:   Abdomen soft, non-tender, non-distended    Data:  CBC:   Lab Results   Component Value Date    WBC 7.3 04/14/2021    RBC 4.65 04/14/2021    HGB 13.9 04/14/2021    HCT 40.8 04/14/2021    MCV 87.7 04/14/2021    MCH 29.9 04/14/2021    MCHC 34.1 04/14/2021    RDW 12.9 04/14/2021     04/14/2021    MPV 8.2 04/14/2021       ASSESSMENT   1. Acute complicated diverticulitis resolving    Plan  1. Patient's diverticulitis has resolved clinically. Patient no longer has a leukocytosis. Patient with benign abdominal exam.  Patient can be discharged to home today. Patient instructed to follow up with me in 2 weeks. Patient understands and agrees with plan.       Electronically signed by [facility] Boni VALENZUELA DO  on 4/14/2021 at 11:09 AM

## 2021-04-14 NOTE — PROGRESS NOTES
Pt. wants IV out, c/o itching at IV site. Pt. states thinking of refusing IV Flagyl @ 0400. Consulted NP, requested order for Benadryl & IV antibiotics possibly changed to P. O.

## 2021-04-14 NOTE — PROGRESS NOTES
Orders received. Pt. Refused Benadryl & requested IV be removed because arm felt numb. Writer removed IV & pt. Stated arm felt immediately better. Pt. Refused new IV access.

## 2021-04-14 NOTE — PLAN OF CARE
Problem: Pain:  Goal: Control of acute pain  Description: Control of acute pain  Outcome: Ongoing   No signs/symptoms of pain noted, pain rating <3 on scale 0-10, pain controlled with medication/repositioning   Problem: Infection:  Goal: Will remain free from infection  Description: Will remain free from infection  Outcome: Ongoing   no new signs/symptoms of infection, continue to monitor labwork including WBC, medications ordered   Problem: Safety:  Goal: Free from accidental physical injury  Description: Free from accidental physical injury  Outcome: Ongoing   No falls/injuries this shift, bed in lowest position, brakes on, bed alarm on, call light in reach, side rails up x2   Problem: Daily Care:  Goal: Daily care needs are met  Description: Daily care needs are met  Outcome: Ongoing   Pt. Participates in ADL's & decision making re: plan of care  Problem: Discharge Planning:  Goal: Patients continuum of care needs are met  Description: Patients continuum of care needs are met  Outcome: Ongoing   Pt. Progressing towards discharge home, goals being met  Problem: Activity:  Goal: Ability to tolerate increased activity will improve  Description: Ability to tolerate increased activity will improve  Outcome: Ongoing   Pt.  Up ambulating independently in hallway

## 2021-04-17 LAB
CULTURE: NORMAL
CULTURE: NORMAL
Lab: NORMAL
Lab: NORMAL
SPECIMEN DESCRIPTION: NORMAL
SPECIMEN DESCRIPTION: NORMAL

## 2021-06-13 ENCOUNTER — HOSPITAL ENCOUNTER (OUTPATIENT)
Dept: LAB | Age: 36
Setting detail: SPECIMEN
Discharge: HOME | End: 2021-06-13
Payer: COMMERCIAL

## 2021-06-13 DIAGNOSIS — Z01.818 PRE-OP TESTING: Primary | ICD-10-CM

## 2021-06-16 ENCOUNTER — ANESTHESIA EVENT (OUTPATIENT)
Dept: OPERATING ROOM | Age: 36
End: 2021-06-16
Payer: COMMERCIAL

## 2021-06-17 ENCOUNTER — ANESTHESIA (OUTPATIENT)
Dept: OPERATING ROOM | Age: 36
End: 2021-06-17
Payer: COMMERCIAL

## 2021-06-17 ENCOUNTER — HOSPITAL ENCOUNTER (OUTPATIENT)
Age: 36
Setting detail: OUTPATIENT SURGERY
Discharge: HOME | End: 2021-06-17
Attending: SURGERY | Admitting: SURGERY
Payer: COMMERCIAL

## 2021-06-17 VITALS
TEMPERATURE: 98 F | DIASTOLIC BLOOD PRESSURE: 78 MMHG | HEART RATE: 71 BPM | RESPIRATION RATE: 18 BRPM | OXYGEN SATURATION: 100 % | WEIGHT: 264 LBS | SYSTOLIC BLOOD PRESSURE: 112 MMHG | BODY MASS INDEX: 36.96 KG/M2 | HEIGHT: 71 IN

## 2021-06-17 VITALS
OXYGEN SATURATION: 100 % | TEMPERATURE: 96.8 F | DIASTOLIC BLOOD PRESSURE: 86 MMHG | SYSTOLIC BLOOD PRESSURE: 160 MMHG | RESPIRATION RATE: 22 BRPM

## 2021-06-17 LAB — GLUCOSE BLD-MCNC: 108 MG/DL (ref 75–110)

## 2021-06-17 PROCEDURE — 2709999900 HC NON-CHARGEABLE SUPPLY: Performed by: SURGERY

## 2021-06-17 PROCEDURE — 6360000002 HC RX W HCPCS: Performed by: ANESTHESIOLOGY

## 2021-06-17 PROCEDURE — 7100000011 HC PHASE II RECOVERY - ADDTL 15 MIN: Performed by: SURGERY

## 2021-06-17 PROCEDURE — 7100000010 HC PHASE II RECOVERY - FIRST 15 MIN: Performed by: SURGERY

## 2021-06-17 PROCEDURE — 3609027000 HC COLONOSCOPY: Performed by: SURGERY

## 2021-06-17 PROCEDURE — 3700000000 HC ANESTHESIA ATTENDED CARE: Performed by: SURGERY

## 2021-06-17 PROCEDURE — 82947 ASSAY GLUCOSE BLOOD QUANT: CPT

## 2021-06-17 PROCEDURE — 2500000003 HC RX 250 WO HCPCS: Performed by: ANESTHESIOLOGY

## 2021-06-17 PROCEDURE — 7100000000 HC PACU RECOVERY - FIRST 15 MIN: Performed by: SURGERY

## 2021-06-17 PROCEDURE — 3700000001 HC ADD 15 MINUTES (ANESTHESIA): Performed by: SURGERY

## 2021-06-17 PROCEDURE — 2580000003 HC RX 258: Performed by: ANESTHESIOLOGY

## 2021-06-17 RX ORDER — SODIUM CHLORIDE, SODIUM LACTATE, POTASSIUM CHLORIDE, CALCIUM CHLORIDE 600; 310; 30; 20 MG/100ML; MG/100ML; MG/100ML; MG/100ML
INJECTION, SOLUTION INTRAVENOUS CONTINUOUS
Status: DISCONTINUED | OUTPATIENT
Start: 2021-06-17 | End: 2021-06-17 | Stop reason: HOSPADM

## 2021-06-17 RX ORDER — PIOGLITAZONEHYDROCHLORIDE 30 MG/1
30 TABLET ORAL DAILY
COMMUNITY

## 2021-06-17 RX ORDER — SODIUM CHLORIDE 0.9 % (FLUSH) 0.9 %
10 SYRINGE (ML) INJECTION PRN
Status: DISCONTINUED | OUTPATIENT
Start: 2021-06-17 | End: 2021-06-17 | Stop reason: HOSPADM

## 2021-06-17 RX ORDER — OXYCODONE HYDROCHLORIDE AND ACETAMINOPHEN 5; 325 MG/1; MG/1
1 TABLET ORAL PRN
Status: DISCONTINUED | OUTPATIENT
Start: 2021-06-17 | End: 2021-06-17 | Stop reason: HOSPADM

## 2021-06-17 RX ORDER — LABETALOL 20 MG/4 ML (5 MG/ML) INTRAVENOUS SYRINGE
10 EVERY 10 MIN PRN
Status: DISCONTINUED | OUTPATIENT
Start: 2021-06-17 | End: 2021-06-17 | Stop reason: HOSPADM

## 2021-06-17 RX ORDER — PROMETHAZINE HYDROCHLORIDE 25 MG/ML
12.5 INJECTION, SOLUTION INTRAMUSCULAR; INTRAVENOUS
Status: DISCONTINUED | OUTPATIENT
Start: 2021-06-17 | End: 2021-06-17 | Stop reason: HOSPADM

## 2021-06-17 RX ORDER — ONDANSETRON 2 MG/ML
4 INJECTION INTRAMUSCULAR; INTRAVENOUS
Status: DISCONTINUED | OUTPATIENT
Start: 2021-06-17 | End: 2021-06-17 | Stop reason: HOSPADM

## 2021-06-17 RX ORDER — SODIUM CHLORIDE 9 MG/ML
INJECTION, SOLUTION INTRAVENOUS CONTINUOUS
Status: DISCONTINUED | OUTPATIENT
Start: 2021-06-17 | End: 2021-06-17 | Stop reason: HOSPADM

## 2021-06-17 RX ORDER — MEPERIDINE HYDROCHLORIDE 50 MG/ML
12.5 INJECTION INTRAMUSCULAR; INTRAVENOUS; SUBCUTANEOUS EVERY 5 MIN PRN
Status: DISCONTINUED | OUTPATIENT
Start: 2021-06-17 | End: 2021-06-17 | Stop reason: HOSPADM

## 2021-06-17 RX ORDER — SODIUM CHLORIDE 9 MG/ML
25 INJECTION, SOLUTION INTRAVENOUS PRN
Status: DISCONTINUED | OUTPATIENT
Start: 2021-06-17 | End: 2021-06-17 | Stop reason: HOSPADM

## 2021-06-17 RX ORDER — OXYCODONE HYDROCHLORIDE AND ACETAMINOPHEN 5; 325 MG/1; MG/1
2 TABLET ORAL PRN
Status: DISCONTINUED | OUTPATIENT
Start: 2021-06-17 | End: 2021-06-17 | Stop reason: HOSPADM

## 2021-06-17 RX ORDER — PROPOFOL 10 MG/ML
INJECTION, EMULSION INTRAVENOUS CONTINUOUS PRN
Status: DISCONTINUED | OUTPATIENT
Start: 2021-06-17 | End: 2021-06-17 | Stop reason: SDUPTHER

## 2021-06-17 RX ORDER — LIDOCAINE HYDROCHLORIDE 10 MG/ML
INJECTION, SOLUTION EPIDURAL; INFILTRATION; INTRACAUDAL; PERINEURAL PRN
Status: DISCONTINUED | OUTPATIENT
Start: 2021-06-17 | End: 2021-06-17 | Stop reason: SDUPTHER

## 2021-06-17 RX ORDER — MORPHINE SULFATE 2 MG/ML
2 INJECTION, SOLUTION INTRAMUSCULAR; INTRAVENOUS EVERY 5 MIN PRN
Status: DISCONTINUED | OUTPATIENT
Start: 2021-06-17 | End: 2021-06-17 | Stop reason: HOSPADM

## 2021-06-17 RX ORDER — SODIUM CHLORIDE 0.9 % (FLUSH) 0.9 %
10 SYRINGE (ML) INJECTION EVERY 12 HOURS SCHEDULED
Status: DISCONTINUED | OUTPATIENT
Start: 2021-06-17 | End: 2021-06-17 | Stop reason: HOSPADM

## 2021-06-17 RX ORDER — 0.9 % SODIUM CHLORIDE 0.9 %
500 INTRAVENOUS SOLUTION INTRAVENOUS
Status: DISCONTINUED | OUTPATIENT
Start: 2021-06-17 | End: 2021-06-17 | Stop reason: HOSPADM

## 2021-06-17 RX ORDER — PROPOFOL 10 MG/ML
INJECTION, EMULSION INTRAVENOUS PRN
Status: DISCONTINUED | OUTPATIENT
Start: 2021-06-17 | End: 2021-06-17 | Stop reason: SDUPTHER

## 2021-06-17 RX ORDER — DIPHENHYDRAMINE HYDROCHLORIDE 50 MG/ML
12.5 INJECTION INTRAMUSCULAR; INTRAVENOUS
Status: DISCONTINUED | OUTPATIENT
Start: 2021-06-17 | End: 2021-06-17 | Stop reason: HOSPADM

## 2021-06-17 RX ADMIN — PROPOFOL INJECTABLE EMULSION 50 MG: 10 INJECTION, EMULSION INTRAVENOUS at 14:25

## 2021-06-17 RX ADMIN — PROPOFOL INJECTABLE EMULSION 100 MG: 10 INJECTION, EMULSION INTRAVENOUS at 14:14

## 2021-06-17 RX ADMIN — PROPOFOL INJECTABLE EMULSION 50 MG: 10 INJECTION, EMULSION INTRAVENOUS at 14:30

## 2021-06-17 RX ADMIN — PROPOFOL INJECTABLE EMULSION 50 MG: 10 INJECTION, EMULSION INTRAVENOUS at 14:21

## 2021-06-17 RX ADMIN — LIDOCAINE HYDROCHLORIDE 50 MG: 10 INJECTION, SOLUTION EPIDURAL; INFILTRATION; INTRACAUDAL; PERINEURAL at 14:14

## 2021-06-17 RX ADMIN — PROPOFOL INJECTABLE EMULSION 50 MG: 10 INJECTION, EMULSION INTRAVENOUS at 14:31

## 2021-06-17 RX ADMIN — SODIUM CHLORIDE: 9 INJECTION, SOLUTION INTRAVENOUS at 13:06

## 2021-06-17 RX ADMIN — PROPOFOL INJECTABLE EMULSION 100 MCG/KG/MIN: 10 INJECTION, EMULSION INTRAVENOUS at 14:17

## 2021-06-17 RX ADMIN — PROPOFOL INJECTABLE EMULSION 50 MG: 10 INJECTION, EMULSION INTRAVENOUS at 14:18

## 2021-06-17 RX ADMIN — PROPOFOL INJECTABLE EMULSION 50 MG: 10 INJECTION, EMULSION INTRAVENOUS at 14:37

## 2021-06-17 RX ADMIN — PROPOFOL INJECTABLE EMULSION 50 MG: 10 INJECTION, EMULSION INTRAVENOUS at 14:28

## 2021-06-17 RX ADMIN — PROPOFOL INJECTABLE EMULSION 50 MG: 10 INJECTION, EMULSION INTRAVENOUS at 14:42

## 2021-06-17 ASSESSMENT — PULMONARY FUNCTION TESTS
PIF_VALUE: 1
PIF_VALUE: 2
PIF_VALUE: 1
PIF_VALUE: 2
PIF_VALUE: 1
PIF_VALUE: 2
PIF_VALUE: 1
PIF_VALUE: 2
PIF_VALUE: 1
PIF_VALUE: 1
PIF_VALUE: 2
PIF_VALUE: 1
PIF_VALUE: 1
PIF_VALUE: 2
PIF_VALUE: 1
PIF_VALUE: 1
PIF_VALUE: 2
PIF_VALUE: 1

## 2021-06-17 ASSESSMENT — LIFESTYLE VARIABLES: SMOKING_STATUS: 1

## 2021-06-17 NOTE — H&P
Update History & Physical    The patient's History and Physical of May 20, 2021 was reviewed with the patient and I examined the patient. There was no change. The surgical site was confirmed by the patient and me. Plan: The risks, benefits, expected outcome, and alternative to the recommended procedure have been discussed with the patient. Patient understands and wants to proceed with the procedure. Electronically signed by Ricky Jaramillo DO on 6/17/2021 at 1:45 PM                          Reason for visit: complicated diverticulitis     Subjective:  30 y/o male here to follow up after recent ER visit. Patient is known to from admission for complicated diverticulitis that was treated nonsurgically. Patient still with left lower quadrant pain. CT ordered by pcp and patient directed to ER when CT suggested worsening progression of diverticulitis. Patient with no leukocytosis and nonsurgical abdomen so patient discharged to home. Patient still with left lower quadrant discomfort but also noticed discomfort periumbilical and right abdominal.  Patient with hx of constipation. Patient has been taking metamucil. Patient does noted the abdominal pain does improve after bowel movements. Denies nausea or vomiting. Denies fevers, chills, or sweats.           Vitals:     05/20/21 1102   Pulse: 78   Resp: 18   SpO2: 99%         Exam  General: patient is resting comfortably in no acute distress. AAOx3  Abdomen: soft, nontender, and nondistended.     Assessment:    Diagnosis Orders   1. Diverticulitis of large intestine with perforation without abscess or bleeding            Plan:  Patient has clinically improved since hospital admission here at [facility].  Patient is nontoxic in appearance. Patient with benign abdominal exam.  CT findings at [facility] do not correlate clinically with physical exam.  Patient does not have an acute surgical abdomen.   Patient with scheduled to have a colonoscopy with me next month. Recommend keep scheduled colonoscopy. Will have discussion with patient after colonoscopy for elective sigmoid colon resection. I explained to patient that even with surgery, he may still have chronic abdominal pain. Surgery is to prevent that portion of sigmoid colon to have another complicated episode of diverticulitis that will lead to emergency surgery and creation of a temporary colostomy.   Patient understands and agrees with plan.      Electronically signed by Anat Plata IV, DO on 5/20/21 at 11:12 AM EDT

## 2021-06-17 NOTE — BRIEF OP NOTE
Brief Postoperative Note      Patient: Karyn Rainey  YOB: 1985  MRN: [id number]    Date of Procedure: 6/17/2021    Pre-Op Diagnosis: DIVERTICULITIS    Post-Op Diagnosis:  1.   sigmoid diverticulosis   2. Resolved diverticulitis       Procedure(s):  COLONOSCOPY DIAGNOSTIC    Surgeon(s):  Gordo Gong IV, DO    Assistant:  * No surgical staff found *    Anesthesia: Monitor Anesthesia Care    Estimated Blood Loss (mL): Minimal    Complications: None    Specimens:   * No specimens in log *    Implants:  * No implants in log *      Drains: * No LDAs found *    Findings: mild sigmoid diverticulitis.   Resolved sigmoid diverticulitis    Electronically signed by [facility] Boni VALENZUELA DO on 6/17/2021 at 2:53 PM

## 2021-06-17 NOTE — ANESTHESIA PRE PROCEDURE
Department of Anesthesiology  Preprocedure Note       Name:  Evelia Palacios   Age:  31 y.o.  :  1985                                          MRN:  [id number]         Date:  2021      Surgeon: Alton Parry):  Dion Plata IV, DO    Procedure: Procedure(s):  COLONOSCOPY DIAGNOSTIC    Medications prior to admission:   Prior to Admission medications    Medication Sig Start Date End Date Taking? Authorizing Provider   pioglitazone (ACTOS) 30 MG tablet Take 30 mg by mouth daily   Yes Historical Provider, MD   lisinopril (PRINIVIL;ZESTRIL) 40 MG tablet Take 40 mg by mouth daily   Yes Historical Provider, MD       Current medications:    Current Facility-Administered Medications   Medication Dose Route Frequency Provider Last Rate Last Admin    0.9 % sodium chloride infusion   Intravenous Continuous Rigo Sherwood MD        lactated ringers infusion   Intravenous Continuous Rigo Sherwood MD        sodium chloride flush 0.9 % injection 10 mL  10 mL Intravenous 2 times per day Rigo Sherwood MD        sodium chloride flush 0.9 % injection 10 mL  10 mL Intravenous PRN Rigo Sherwood MD        0.9 % sodium chloride infusion  25 mL Intravenous PRN Rigo Sherwood MD           Allergies: Allergies   Allergen Reactions    Adhesive Tape Rash       Problem List:    Patient Active Problem List   Diagnosis Code    Tobacco abuse Z72.0    Hypertension I10    Diverticulosis K57.90    Diverticulitis of large intestine with perforation K57.20    Diverticular disease of large intestine with complication U01.34    Obesity, Class III, BMI 40-49.9 (morbid obesity) ([facility] [address] E66.01    New onset type 2 diabetes mellitus ([facility] [address] E11.9       Past Medical History:        Diagnosis Date    Diabetes mellitus ([facility] [address]     dicerticullits     6525. not  hospitalized    Diverticulitis of large intestine with perforation     Hypertension     Tobacco abuse        Past Surgical History:  History reviewed. No pertinent surgical history.     Social History: Social History     Tobacco Use    Smoking status: Current Every Day Smoker     Packs/day: 0.25     Years: 15.00     Pack years: 3.75    Smokeless tobacco: Never Used   Substance Use Topics    Alcohol use: Yes     Comment: Socially                                Ready to quit: Not Answered  Counseling given: Not Answered      Vital Signs (Current):   Vitals:    06/17/21 1049   BP: (!) 140/90   Pulse: 66   Resp: 18   Temp: 97 °F (36.1 °C)   SpO2: 97%   Weight: 264 lb (119.7 kg)   Height: 5' 11\" (1.803 m)                                              BP Readings from Last 3 Encounters:   06/17/21 (!) 140/90   04/14/21 128/85       NPO Status: Time of last liquid consumption: 2000                        Time of last solid consumption: 2000                        Date of last liquid consumption: 06/16/21                        Date of last solid food consumption: 06/15/21    BMI:   Wt Readings from Last 3 Encounters:   06/17/21 264 lb (119.7 kg)   05/20/21 289 lb (131.1 kg)   04/14/21 289 lb 12.8 oz (131.5 kg)     Body mass index is 36.82 kg/m². CBC:   Lab Results   Component Value Date    WBC 7.3 04/14/2021    RBC 4.65 04/14/2021    HGB 13.9 04/14/2021    HCT 40.8 04/14/2021    MCV 87.7 04/14/2021    RDW 12.9 04/14/2021     04/14/2021       CMP:   Lab Results   Component Value Date     04/14/2021    K 4.2 04/14/2021     04/14/2021    CO2 27 04/14/2021    BUN 8 04/14/2021    CREATININE 0.73 04/14/2021    GFRAA >60 04/14/2021    LABGLOM >60 04/14/2021    GLUCOSE 128 04/14/2021    PROT 6.8 04/12/2021    CALCIUM 9.3 04/14/2021    BILITOT 0.78 04/12/2021    ALKPHOS 71 04/12/2021    AST 25 04/12/2021    ALT 52 04/12/2021       POC Tests: No results for input(s): POCGLU, POCNA, POCK, POCCL, POCBUN, POCHEMO, POCHCT in the last 72 hours.     Coags:   Lab Results   Component Value Date    PROTIME 10.7 04/11/2021    INR 1.0 04/11/2021    APTT 25.6 04/11/2021       HCG (If Applicable): No results found for: PREGTESTUR, PREGSERUM, HCG, HCGQUANT     ABGs: No results found for: PHART, PO2ART, NBX8UWG, UUG5BAB, BEART, P7WOJWYS     Type & Screen (If Applicable):  No results found for: LABABO, LABRH    Drug/Infectious Status (If Applicable):  No results found for: HIV, HEPCAB    COVID-19 Screening (If Applicable):   Lab Results   Component Value Date    COVID19 Not Detected 04/11/2021           Anesthesia Evaluation  Patient summary reviewed and Nursing notes reviewed  Airway: Mallampati: III  TM distance: >3 FB   Neck ROM: full  Mouth opening: > = 3 FB Dental: normal exam         Pulmonary:normal exam    (+) COPD:  current smoker                          ROS comment: -SMOKES 1 PPD FOR 16 YEARS   Cardiovascular:    (+) hypertension:,                   Neuro/Psych:   Negative Neuro/Psych ROS              GI/Hepatic/Renal:   (+) morbid obesity          Endo/Other:    (+) Diabetes, . ROS comment: -NPO AFTER MIDNIGHT  -NKDA Abdominal:           Vascular: negative vascular ROS. Anesthesia Plan      MAC     ASA 3       Induction: intravenous. MIPS: Postoperative opioids intended and Prophylactic antiemetics administered. Anesthetic plan and risks discussed with patient. Plan discussed with CRNA.     Attending anesthesiologist reviewed and agrees with Pre Eval content              Denys Sheikh MD   6/17/2021

## 2021-06-17 NOTE — ANESTHESIA POSTPROCEDURE EVALUATION
Department of Anesthesiology  Postprocedure Note    Patient: Naina Mendez  MRN: [id number]  YOB: 1985  Date of evaluation: 6/17/2021  Time:  3:30 PM     Procedure Summary     Date: 06/17/21 Room / Location: [address] [place] [address]    Anesthesia Start: 4763 Anesthesia Stop: 0837    Procedure: COLONOSCOPY DIAGNOSTIC (N/A ) Diagnosis: (DIVERTICULITIS)    Surgeons: Orin Kramer DO Responsible Provider: Tran Bennett MD    Anesthesia Type: MAC ASA Status: 3          Anesthesia Type: MAC    Wayne Phase I: Wayne Score: 9    Wayne Phase II: Wayne Score: 10    Last vitals: Reviewed and per EMR flowsheets.        Anesthesia Post Evaluation    Patient location during evaluation: PACU  Patient participation: complete - patient participated  Level of consciousness: awake and alert  Airway patency: patent  Nausea & Vomiting: no nausea and no vomiting  Complications: no  Cardiovascular status: hemodynamically stable  Respiratory status: nasal cannula and spontaneous ventilation  Hydration status: euvolemic

## 2021-06-17 NOTE — OP NOTE
Operative Note      Patient: Dana Ocampo  YOB: 1985  MRN: [id number]    Date of Procedure: 6/17/2021    Pre-Op Diagnosis: DIVERTICULITIS    Post-Op Diagnosis:   1.  sigmoid diverticulosis    2. Resolved sigmoid diverticulitis       Procedure(s):  COLONOSCOPY DIAGNOSTIC    Surgeon(s):  Gordo Gong IV, DO    Assistant:   * No surgical staff found *    Anesthesia: Monitor Anesthesia Care    Estimated Blood Loss (mL): Minimal    Complications: None    Specimens:   * No specimens in log *    Implants:  * No implants in log *      Drains: * No LDAs found *    SPECIMENS: were not obtained    Findings: mild sigmoid diverticulosis, resolved sigmoid diverticulitis    HISTORY: The patient is a 31y.o. year old male with history of above preop diagnosis. Colonoscopy with possible biopsy or polypectomy has been recommended and I explained the risk, benefits, expected outcome, and alternatives to the procedure. Risks included but are not limited to bleeding, infection, respiratory distress, hypotension, and perforation of the colon. The patient understands and is in agreement. PROCEDURE: The patient was given monitored anesthesia care. The patient was given oxygen by nasal cannula. The colonoscope was inserted per rectum and advanced under direct vision to the cecum without difficulty. Findings:  Cecum/Ascending colon: normal    Transverse colon: normal    Descending/Sigmoid colon: abnormal: Mild sigmoid diverticulosis. No evidence of active diverticulitis. Rectum/Anus: examined in normal and retroflexed positions and was normal    The colon was decompressed and the scope was removed. The patient tolerated the procedure well. Recommendations: Follow up with me in office 1 week to discuss future surgical options.     Electronically signed by Neda Plata IV, DO on 6/17/2021 at 2:54 PM

## (undated) DEVICE — GOWN,AURORA,NONRNF,XL,30/CS: Brand: MEDLINE

## (undated) DEVICE — SYRINGE MED 50ML LUERLOCK TIP

## (undated) DEVICE — BASIN EMSIS 700ML GRAPHITE PLAS KID SHP GRAD

## (undated) DEVICE — FLUFF UNDERPAD,MODERATE: Brand: WINGS

## (undated) DEVICE — TUBING, SUCTION, 3/16" X 10', STRAIGHT: Brand: MEDLINE

## (undated) DEVICE — TUBING INSUFFLATION CAP W/ EXT CARBON DIOX ENDO SMARTCAP

## (undated) DEVICE — CONNECTOR TBNG AUX H2O JET DISP FOR OLY 160/180 SER

## (undated) DEVICE — SPONGE GZ W4XL4IN RAYON POLY FILL CVR W/ NONWOVEN FAB

## (undated) DEVICE — 4-PORT MANIFOLD: Brand: NEPTUNE 2

## (undated) DEVICE — Device: Brand: DEFENDO VALVE AND CONNECTOR KIT

## (undated) DEVICE — ADAPTER,CATHETER/SYRINGE/LUER,STERILE: Brand: MEDLINE

## (undated) DEVICE — JELLY,LUBE,STERILE,FLIP TOP,TUBE,2-OZ: Brand: MEDLINE